# Patient Record
Sex: FEMALE
[De-identification: names, ages, dates, MRNs, and addresses within clinical notes are randomized per-mention and may not be internally consistent; named-entity substitution may affect disease eponyms.]

---
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
## 2024-02-22 ENCOUNTER — OFFICE VISIT (OUTPATIENT)
Dept: FAMILY MEDICINE CLINIC | Facility: CLINIC | Age: 19
End: 2024-02-22
Payer: MEDICAID

## 2024-02-22 VITALS
SYSTOLIC BLOOD PRESSURE: 112 MMHG | OXYGEN SATURATION: 99 % | HEART RATE: 84 BPM | WEIGHT: 127.5 LBS | RESPIRATION RATE: 16 BRPM | HEIGHT: 64 IN | BODY MASS INDEX: 21.77 KG/M2 | DIASTOLIC BLOOD PRESSURE: 68 MMHG | TEMPERATURE: 97 F

## 2024-02-22 DIAGNOSIS — K62.5 RECTAL BLEEDING: ICD-10-CM

## 2024-02-22 DIAGNOSIS — K59.09 CHRONIC CONSTIPATION: ICD-10-CM

## 2024-02-22 DIAGNOSIS — M25.50 PAIN IN JOINT, MULTIPLE SITES: ICD-10-CM

## 2024-02-22 DIAGNOSIS — G44.219 EPISODIC TENSION-TYPE HEADACHE, NOT INTRACTABLE: Primary | ICD-10-CM

## 2024-02-22 DIAGNOSIS — Z13.21 ENCOUNTER FOR VITAMIN DEFICIENCY SCREENING: ICD-10-CM

## 2024-02-22 DIAGNOSIS — E28.2 PCOS (POLYCYSTIC OVARIAN SYNDROME): ICD-10-CM

## 2024-02-22 DIAGNOSIS — Z00.00 LABORATORY EXAM ORDERED AS PART OF ROUTINE GENERAL MEDICAL EXAMINATION: ICD-10-CM

## 2024-02-22 PROCEDURE — 99204 OFFICE O/P NEW MOD 45 MIN: CPT | Performed by: FAMILY MEDICINE

## 2024-02-22 RX ORDER — DROSPIRENONE AND ETHINYL ESTRADIOL 0.02-3(28)
1 KIT ORAL DAILY
COMMUNITY
Start: 2024-01-01

## 2024-02-22 RX ORDER — SPIRONOLACTONE 50 MG/1
50 TABLET, FILM COATED ORAL DAILY
COMMUNITY
Start: 2023-11-19

## 2024-02-22 NOTE — PROGRESS NOTES
Subjective:   Patient ID: Charmaine Bowens is a 18 year old female.    HPI  18yr old female presents as a new patient with multiple concerns today.   C/o headaches band-like across her forehead ongoing for the past 2-3wks. Usually notices it at nighttime. Does note having sensitivity to light and sound along with nausea. No vomiting. +stress. Does not drink enough fluids or eat a regular, healthy diet. No personal or family hx of migraine headaches.   Notes having joint pains in both her feet and knees over the past 2-3wks. No change in activity. No personal or family hx of autoimmune/rheum disorders.   PCOS, following with gyne. Stopped OCPs. Taking spironolactone 50mg po daily per pt. Menses irregular, LMP 1/26.   Chronic constipation over the past 5mo and notes bright red blood in stools.     Pmhx: PCOS  Pshx: none  All: NKDA  Meds: spironolactone 50mg po daily   Fam Hx: mom: arthritis, dad: HTN, HL, MGM: DM2, PGM: DM2  Soc Hx: engaged, studying at [employer], working at [employer], lives with parents and siblings, no pets, no smokers  Ob/gyne: menarche: 12, periods have always been irregular, LMP: 1/26, lasts about 1wk, never sexually active  Immunizations: UTD    History/Other:   Review of Systems   Constitutional:  Negative for appetite change and fatigue.   Eyes:  Positive for photophobia. Negative for visual disturbance.   Gastrointestinal:  Positive for blood in stool and constipation. Negative for abdominal pain, diarrhea, nausea and vomiting.   Genitourinary:  Positive for menstrual problem.   Musculoskeletal:  Positive for arthralgias.   Neurological:  Positive for headaches. Negative for dizziness and light-headedness.     Current Outpatient Medications   Medication Sig Dispense Refill    spironolactone 50 MG Oral Tab Take 1 tablet (50 mg total) by mouth daily. (Patient not taking: Reported on 2/26/2024)      Drospirenone-Ethinyl Estradiol 3-0.02 MG Oral Tab Take 1 tablet by mouth daily. (Patient not  taking: Reported on 2/22/2024)       Allergies:Not on File    Objective:   Physical Exam  Vitals and nursing note reviewed.   Constitutional:       Appearance: Normal appearance.   HENT:      Head: Normocephalic and atraumatic.      Right Ear: Tympanic membrane normal.      Left Ear: Tympanic membrane normal.      Mouth/Throat:      Mouth: Mucous membranes are moist.   Eyes:      Extraocular Movements: Extraocular movements intact.      Pupils: Pupils are equal, round, and reactive to light.   Cardiovascular:      Rate and Rhythm: Normal rate and regular rhythm.   Pulmonary:      Effort: Pulmonary effort is normal.      Breath sounds: Normal breath sounds. No wheezing, rhonchi or rales.   Abdominal:      General: Bowel sounds are normal.      Palpations: Abdomen is soft.      Tenderness: There is no abdominal tenderness. There is no guarding or rebound.   Musculoskeletal:      Cervical back: Neck supple.   Skin:     General: Skin is warm and dry.   Neurological:      General: No focal deficit present.      Mental Status: She is alert and oriented to person, place, and time.      Cranial Nerves: No cranial nerve deficit.   Psychiatric:         Mood and Affect: Mood normal.         Behavior: Behavior normal.         Assessment & Plan:   1. Episodic tension-type headache, not intractable  - neuro exam wnl  - discussed healthy eating habits, increase water intake, proper sleep hygiene, regular exercise and headache diary  - manage stress    2. Pain in joint, multiple sites  - will check labs  - symptomatic tx    3. PCOS (polycystic ovarian syndrome)  - following with gyne    4. Chronic constipation  5. Rectal bleeding  - Pt is instructed to increase fiber in the diet by eating a bran cereal in the morning and more fruits and vegetables during the day  - instructed to use Metamucil and Miralax daily  - fluids also need to be increased  - if persists, should return and consider GI referral for colonoscopy.      6.  Laboratory exam ordered as part of routine general medical examination  - CBC With Differential With Platelet; Future  - Comp Metabolic Panel; Future  - Lipid Panel; Future  - TSH W Reflex To Free T4; Future    7. Encounter for vitamin deficiency screening  - Vitamin D, 25-Hydroxy; Future  - Vitamin B12 [E]; Future    She understands and agrees with tx plan  RTC after completing labs for annual physical, sooner if needed    Meds This Visit:  Requested Prescriptions      No prescriptions requested or ordered in this encounter       Imaging & Referrals:  None

## 2024-02-23 ENCOUNTER — LAB ENCOUNTER (OUTPATIENT)
Dept: LAB | Age: 19
End: 2024-02-23
Attending: FAMILY MEDICINE
Payer: MEDICAID

## 2024-02-23 DIAGNOSIS — Z13.21 ENCOUNTER FOR VITAMIN DEFICIENCY SCREENING: ICD-10-CM

## 2024-02-23 DIAGNOSIS — Z00.00 LABORATORY EXAM ORDERED AS PART OF ROUTINE GENERAL MEDICAL EXAMINATION: ICD-10-CM

## 2024-02-23 LAB
ALBUMIN SERPL-MCNC: 4 G/DL (ref 3.4–5)
ALBUMIN/GLOB SERPL: 1.2 {RATIO} (ref 1–2)
ALP LIVER SERPL-CCNC: 68 U/L
ALT SERPL-CCNC: 26 U/L
ANION GAP SERPL CALC-SCNC: 5 MMOL/L (ref 0–18)
AST SERPL-CCNC: 25 U/L (ref 15–37)
BASOPHILS # BLD AUTO: 0.02 X10(3) UL (ref 0–0.2)
BASOPHILS NFR BLD AUTO: 0.3 %
BILIRUB SERPL-MCNC: 0.8 MG/DL (ref 0.1–2)
BUN BLD-MCNC: 9 MG/DL (ref 9–23)
CALCIUM BLD-MCNC: 9 MG/DL (ref 8.5–10.1)
CHLORIDE SERPL-SCNC: 108 MMOL/L (ref 98–112)
CHOLEST SERPL-MCNC: 163 MG/DL (ref ?–200)
CO2 SERPL-SCNC: 25 MMOL/L (ref 21–32)
CREAT BLD-MCNC: 0.61 MG/DL
EGFRCR SERPLBLD CKD-EPI 2021: 132 ML/MIN/1.73M2 (ref 60–?)
EOSINOPHIL # BLD AUTO: 0.05 X10(3) UL (ref 0–0.7)
EOSINOPHIL NFR BLD AUTO: 0.8 %
ERYTHROCYTE [DISTWIDTH] IN BLOOD BY AUTOMATED COUNT: 12.8 %
FASTING PATIENT LIPID ANSWER: YES
FASTING STATUS PATIENT QL REPORTED: YES
GLOBULIN PLAS-MCNC: 3.4 G/DL (ref 2.8–4.4)
GLUCOSE BLD-MCNC: 93 MG/DL (ref 70–99)
HCT VFR BLD AUTO: 38.5 %
HDLC SERPL-MCNC: 55 MG/DL (ref 40–59)
HGB BLD-MCNC: 12.6 G/DL
IMM GRANULOCYTES # BLD AUTO: 0.01 X10(3) UL (ref 0–1)
IMM GRANULOCYTES NFR BLD: 0.2 %
LDLC SERPL CALC-MCNC: 99 MG/DL (ref ?–100)
LYMPHOCYTES # BLD AUTO: 2.5 X10(3) UL (ref 1.5–5)
LYMPHOCYTES NFR BLD AUTO: 41.7 %
MCH RBC QN AUTO: 27.7 PG (ref 26–34)
MCHC RBC AUTO-ENTMCNC: 32.7 G/DL (ref 31–37)
MCV RBC AUTO: 84.6 FL
MONOCYTES # BLD AUTO: 0.54 X10(3) UL (ref 0.1–1)
MONOCYTES NFR BLD AUTO: 9 %
NEUTROPHILS # BLD AUTO: 2.88 X10 (3) UL (ref 1.5–7.7)
NEUTROPHILS # BLD AUTO: 2.88 X10(3) UL (ref 1.5–7.7)
NEUTROPHILS NFR BLD AUTO: 48 %
NONHDLC SERPL-MCNC: 108 MG/DL (ref ?–130)
OSMOLALITY SERPL CALC.SUM OF ELEC: 284 MOSM/KG (ref 275–295)
PLATELET # BLD AUTO: 239 10(3)UL (ref 150–450)
POTASSIUM SERPL-SCNC: 3.6 MMOL/L (ref 3.5–5.1)
PROT SERPL-MCNC: 7.4 G/DL (ref 6.4–8.2)
RBC # BLD AUTO: 4.55 X10(6)UL
SODIUM SERPL-SCNC: 138 MMOL/L (ref 136–145)
TRIGL SERPL-MCNC: 42 MG/DL (ref 30–149)
TSI SER-ACNC: 1.49 MIU/ML (ref 0.36–3.74)
VIT B12 SERPL-MCNC: 426 PG/ML (ref 193–986)
VLDLC SERPL CALC-MCNC: 7 MG/DL (ref 0–30)
WBC # BLD AUTO: 6 X10(3) UL (ref 4–11)

## 2024-02-23 PROCEDURE — 80061 LIPID PANEL: CPT

## 2024-02-23 PROCEDURE — 82306 VITAMIN D 25 HYDROXY: CPT

## 2024-02-23 PROCEDURE — 85025 COMPLETE CBC W/AUTO DIFF WBC: CPT

## 2024-02-23 PROCEDURE — 80053 COMPREHEN METABOLIC PANEL: CPT

## 2024-02-23 PROCEDURE — 82607 VITAMIN B-12: CPT

## 2024-02-23 PROCEDURE — 84443 ASSAY THYROID STIM HORMONE: CPT

## 2024-02-23 PROCEDURE — 36415 COLL VENOUS BLD VENIPUNCTURE: CPT

## 2024-02-24 LAB — VIT D+METAB SERPL-MCNC: 14.3 NG/ML (ref 30–100)

## 2024-02-26 ENCOUNTER — OFFICE VISIT (OUTPATIENT)
Dept: FAMILY MEDICINE CLINIC | Facility: CLINIC | Age: 19
End: 2024-02-26
Payer: MEDICAID

## 2024-02-26 VITALS
DIASTOLIC BLOOD PRESSURE: 56 MMHG | OXYGEN SATURATION: 98 % | RESPIRATION RATE: 16 BRPM | HEART RATE: 92 BPM | HEIGHT: 64 IN | TEMPERATURE: 98 F | WEIGHT: 127.5 LBS | SYSTOLIC BLOOD PRESSURE: 102 MMHG | BODY MASS INDEX: 21.77 KG/M2

## 2024-02-26 DIAGNOSIS — Z00.00 ROUTINE GENERAL MEDICAL EXAMINATION AT A HEALTH CARE FACILITY: Primary | ICD-10-CM

## 2024-02-26 DIAGNOSIS — E28.2 PCOS (POLYCYSTIC OVARIAN SYNDROME): ICD-10-CM

## 2024-02-26 DIAGNOSIS — N94.6 DYSMENORRHEA: ICD-10-CM

## 2024-02-26 DIAGNOSIS — E55.9 VITAMIN D DEFICIENCY: ICD-10-CM

## 2024-02-26 PROCEDURE — 99395 PREV VISIT EST AGE 18-39: CPT | Performed by: FAMILY MEDICINE

## 2024-02-26 PROCEDURE — 99213 OFFICE O/P EST LOW 20 MIN: CPT | Performed by: FAMILY MEDICINE

## 2024-02-26 RX ORDER — IBUPROFEN 600 MG/1
600 TABLET ORAL 2 TIMES DAILY PRN
Qty: 60 TABLET | Refills: 0 | Status: SHIPPED | OUTPATIENT
Start: 2024-02-26

## 2024-02-26 NOTE — PROGRESS NOTES
Chief Complaint   Patient presents with    Physical       HPI:  18yr old female presents for her annual physical and f/u on recent labs.   PCOS, not taking any meds for it. Started her period and notes increased pain and cramping. Requesting rx for ibuprofen.     Review of Systems   Constitutional: Negative for fever, chills, +fatigue   HENT: Negative for hearing loss, congestion, sore throat    Eyes: Negative for pain and visual disturbance.   Respiratory: Negative for cough, chest tightness, shortness of breath and wheezing.    Cardiovascular: Negative for chest pain, palpitations and leg swelling.   Gastrointestinal: Negative for nausea, vomiting, abdominal pain, diarrhea   Genitourinary: Negative for dysuria, hematuria and difficulty urinating. Per hpi  Musculoskeletal: Negative for myalgias, back pain, joint swelling   Skin: Negative for color change and rash.   Neurological: Negative for dizziness, syncope, weakness, and headaches.   Hematological: Negative for adenopathy. Does not bruise/bleed easily.   Psychiatric/Behavioral: The patient is not nervous/anxious. No depression.    There is no problem list on file for this patient.      No past medical history on file.  No past surgical history on file.  No family history on file.  Social History     Socioeconomic History    Marital status: Single   Tobacco Use    Smoking status: Never     Passive exposure: Never    Smokeless tobacco: Never   Vaping Use    Vaping Use: Never used   Substance and Sexual Activity    Alcohol use: Never    Drug use: Never    Sexual activity: Never       Current Outpatient Medications   Medication Sig Dispense Refill    Cholecalciferol (VITAMIN D3) 1.25 MG (35772 UT) Oral Tab Take 50,000 tablets by mouth every 7 days. 12 tablet 0    ibuprofen 600 MG Oral Tab Take 1 tablet (600 mg total) by mouth 2 (two) times daily as needed for Pain. 60 tablet 0    spironolactone 50 MG Oral Tab Take 1 tablet (50 mg total) by mouth daily. (Patient  not taking: Reported on 2/26/2024)      Drospirenone-Ethinyl Estradiol 3-0.02 MG Oral Tab Take 1 tablet by mouth daily. (Patient not taking: Reported on 2/22/2024)         Not on File      Physical Exam  /56   Pulse 92   Temp 97.8 °F (36.6 °C) (Temporal)   Resp 16   Ht 5' 4\" (1.626 m)   Wt 127 lb 8 oz (57.8 kg)   LMP 02/25/2024   SpO2 98%   BMI 21.89 kg/m²   Constitutional: Oriented to person, place, and time. No distress.   HEENT:  Normocephalic and atraumatic. Hearing and tympanic membranes normal. Oropharynx is clear and moist.   Eyes: EOM are normal. PERRLA.    Neck: Supple  Cardiovascular: Normal rate, regular rhythm and intact distal pulses.  No murmur, rubs or gallops.   Pulmonary/Chest: Effort normal and breath sounds normal. No respiratory distress. No wheezes, rhonchi or rales  Abdominal: Soft. Bowel sounds are normal. Non tender, no masses, no organomegaly   Musculoskeletal: Normal range of motion. No edema and no tenderness.   Lymphadenopathy: No cervical adenopathy.   Neurological: Normal reflexes. No cranial nerve deficit or sensory deficit. Normal muscle tone. Coordination normal.   Skin: Skin is warm and dry. No rash noted. No erythema   Psychiatric: Normal mood and affect.     Health Maintenance:    1. Colon cancer screening: n/a  2. Last mammogram: n/a  3. Last Pap smear: n/a  4. Dexa Scan: n/a  5. Immunizations:   Immunization History   Administered Date(s) Administered    >=3 YRS TRI  MULTIDOSE VIAL (60442) FLU CLINIC 11/14/2014, 10/06/2017    Covid-19 Vaccine Pfizer 30 mcg/0.3 ml 08/10/2021, 09/04/2021    DTAP INFANRIX 05/01/2004, 06/14/2004, 07/30/2004, 08/30/2004    Flucelvax 0.5ml Vaccine 10/01/2020, 09/04/2022, 10/02/2023    HEP A,Ped/Adol,(2 Dose) 04/28/2006    Hep B, Unspecified Formulation 11/17/2014, 05/02/2016, 06/23/2016    IPV 11/14/2014    Influenza 11/13/2016    MMR 11/14/2014, 05/02/2016    Meningococcal B, Omv 07/14/2021    Meningococcal-Menveo 10-55 YEARS  05/02/2016, 07/14/2021    TDAP 11/14/2014, 08/24/2018    Varicella 03/30/2005, 11/14/2014       Osteoporosis Screening: Post menopausal women with a > 9% of fracture in the next 10 years.   Http://www.shef.ac.uk/FRAX/tool.aspx?country=9    Cervical Cancer Screening: The USPSTF recommends screening for cervical cancer in women ages 21 to 65 years with cytology (Pap smear) every 3 years or, for women ages 30 to 65 years who want to lengthen the screening interval, screening with a combination of cytology and human papillomavirus (HPV) testing every 5 years.    Breast Cancer Screening: Yearly starting at the age of 40.  If positive family hx (first degree relative) - Annual mammography starting ten years prior to the age of the youngest family member with breast cancer (but not earlier than age 25 and not later than age 40)    Colon Cancer Screening: Starting at the age of 50.  Yearly FOBT, sigmoidoscopy every 5 years, or colonoscopy every 10 years.       A/P:  1. Routine general medical examination at a health care facility  - reviewed anticipatory guidance based on age    2. Vitamin D deficiency  - vit d: 14.3  - start vit d3 50,000u qweekly x 3mo, then vit d3 2000iu daily otc  - Cholecalciferol (VITAMIN D3) 1.25 MG (06237 UT) Oral Tab; Take 50,000 tablets by mouth every 7 days.  Dispense: 12 tablet; Refill: 0    3. PCOS (polycystic ovarian syndrome)  4. Dysmenorrhea  - following with gyne  - given rx for ibuprofen to help with menstrual pain/bleeding, reviewed indications, dosing and SEs   - monitor  - ibuprofen 600 MG Oral Tab; Take 1 tablet (600 mg total) by mouth 2 (two) times daily as needed for Pain.  Dispense: 60 tablet; Refill: 0    She understands and agrees with tx plan  RTC in upcoming weeks to discuss other issues     Imaging & Consults:  None      No follow-ups on file.

## 2024-03-13 ENCOUNTER — TELEPHONE (OUTPATIENT)
Age: 19
End: 2024-03-13

## 2024-03-14 ENCOUNTER — OFFICE VISIT (OUTPATIENT)
Dept: FAMILY MEDICINE CLINIC | Facility: CLINIC | Age: 19
End: 2024-03-14
Payer: MEDICAID

## 2024-03-14 VITALS
WEIGHT: 128 LBS | RESPIRATION RATE: 16 BRPM | DIASTOLIC BLOOD PRESSURE: 80 MMHG | SYSTOLIC BLOOD PRESSURE: 109 MMHG | HEART RATE: 113 BPM | HEIGHT: 64 IN | TEMPERATURE: 98 F | BODY MASS INDEX: 21.85 KG/M2 | OXYGEN SATURATION: 97 %

## 2024-03-14 DIAGNOSIS — J11.1 INFLUENZA-LIKE ILLNESS: Primary | ICD-10-CM

## 2024-03-14 PROCEDURE — 87637 SARSCOV2&INF A&B&RSV AMP PRB: CPT | Performed by: NURSE PRACTITIONER

## 2024-03-14 PROCEDURE — 99213 OFFICE O/P EST LOW 20 MIN: CPT | Performed by: NURSE PRACTITIONER

## 2024-03-14 RX ORDER — OSELTAMIVIR PHOSPHATE 75 MG/1
75 CAPSULE ORAL 2 TIMES DAILY
Qty: 10 CAPSULE | Refills: 0 | Status: SHIPPED | OUTPATIENT
Start: 2024-03-14 | End: 2024-03-19

## 2024-03-14 NOTE — PROGRESS NOTES
Chief Complaint   Patient presents with    Cold     Suspected fever, chills, runny nose, sore throat, sneezing, x2 days    :    HPI:   Charmaine Bowens is a 18 year old female who presents for upper respiratory symptoms for 2 days. Started suddenly.  Symptoms have been consistent since onset.  Feeling feverish, chills, headache, congestion, dry cough, malaise, body aches, weakness, rhinorrhea, no sore throat. Tolerating po. Denies rash, N/V/D.   Did not receive flu vaccine this season.  unknown influenza exposure. unknown COVID exposure.  Treatment tried: OTC    Current Outpatient Medications   Medication Sig Dispense Refill    oseltamivir 75 MG Oral Cap Take 1 capsule (75 mg total) by mouth 2 (two) times daily for 5 days. 10 capsule 0    buPROPion  MG Oral Tablet 24 Hr Take 1 tablet (150 mg total) by mouth daily. 30 tablet 1    Cholecalciferol (VITAMIN D3) 1.25 MG (36354 UT) Oral Tab Take 50,000 tablets by mouth every 7 days. 12 tablet 0    ibuprofen 600 MG Oral Tab Take 1 tablet (600 mg total) by mouth 2 (two) times daily as needed for Pain. 60 tablet 0    spironolactone 50 MG Oral Tab Take 1 tablet (50 mg total) by mouth daily. (Patient not taking: Reported on 2/26/2024)      Drospirenone-Ethinyl Estradiol 3-0.02 MG Oral Tab Take 1 tablet by mouth daily. (Patient not taking: Reported on 2/22/2024)        No past medical history on file.   No past surgical history on file.   Family History   Problem Relation Age of Onset    Diabetes Maternal Grandmother     Diabetes Paternal Grandmother     Asthma Brother       Social History     Socioeconomic History    Marital status: Single   Tobacco Use    Smoking status: Never     Passive exposure: Never    Smokeless tobacco: Never   Vaping Use    Vaping Use: Never used   Substance and Sexual Activity    Alcohol use: Never    Drug use: Never    Sexual activity: Never         REVIEW OF SYSTEMS:   GENERAL: see HPI  SKIN: no rashes  EYES:denies blurred vision or double  vision  HEENT: congested; see HPI  CHEST: no chest pains, palpitations.  LUNGS: denies shortness of breath or wheezing.  CARDIOVASCULAR: denies chest pain.  GI: no abdominal pain; see HPI  URO: no decreased urination.      EXAM:   /80   Pulse 113   Temp 98.2 °F (36.8 °C) (Temporal)   Resp 16   Ht 5' 4\" (1.626 m)   Wt 128 lb (58.1 kg)   LMP 02/25/2024   SpO2 97%   BMI 21.97 kg/m²   GENERAL: well developed, well nourished, in no apparent distress, acutely sick.  SKIN: no rashes,no suspicious lesions, flushed.  Warm to touch.  HEAD: atraumatic, normocephalic,  no tenderness on palpation of sinuses  EYES: conjunctiva clear  EARS: TM's clear gray, no bulging, no retraction, no fluid, bony landmarks visible  NOSE: nostrils patent, clear nasal mucous, nasal mucosa reddened and swollen  THROAT: oral mucosa pink, moist. Posterior pharynx is not erythematous. no exudates.  NECK: supple, non-tender  LUNGS: clear to auscultation bilaterally, no wheezes or rhonchi. Breathing is non labored.  Dry cough.  CARDIO: RRR without murmur  GI: good BS's,no masses, HSM or tenderness  EXTREMITIES: no cyanosis, clubbing or edema  LYMPH:  + anterior cervical lymphadenopathy.          ASSESSMENT AND PLAN:   Charmaine Bowens is a 18 year old female who presents with flu-like symptoms    ASSESSMENT:  Encounter Diagnosis   Name Primary?    Influenza-like illness Yes       PLAN:  Natural course of influenza, possible complications, CDC recommendations, and treatment options discussed.  Patient has elected to start Tamiflu after discussion of risks and benefits. Explained may lessen severity and duration of symptoms, but will not completely remove symptoms.  Quad panel sent.  Will stop Tamiflu if negative influenza.  Rest, increase fluids,ibuprofen/tylenol q 6 hours for fever/aches prn.   Discussed OTC options for symptom relief.    Complications of influenza discussed including secondary infections such as AOM, bronchitis, PNA, sinusitis.   To be rechecked if exhibiting any symptoms of these illnesses.   To f/u with PCP in 3-4 days if sx's persist. Seek immediate medical attention for acute or worsening symptoms.   Verbalizes understanding of these issues and agrees to the plan.    Meds & Refills for this Visit:  Requested Prescriptions     Signed Prescriptions Disp Refills    oseltamivir 75 MG Oral Cap 10 capsule 0     Sig: Take 1 capsule (75 mg total) by mouth 2 (two) times daily for 5 days.         Patient Instructions   I recommend the 4 H's for inflammation:    1. Heat (warm mist from the shower or warm liquids such as tea)  2. Honey (mixed in your tea or by the spoonful [if you are not diabetic; over the age of 1 year]--take a spoonful 3 times a day and don't eat or drink anything for 15-20 minutes)  3. Humidity--cool mist in the bedroom at night  4. Hydration --at least 8 -10 glasses a day

## 2024-03-15 LAB
FLUAV + FLUBV RNA SPEC NAA+PROBE: NOT DETECTED
FLUAV + FLUBV RNA SPEC NAA+PROBE: NOT DETECTED
RSV RNA SPEC NAA+PROBE: NOT DETECTED
SARS-COV-2 RNA RESP QL NAA+PROBE: NOT DETECTED

## 2024-03-20 ENCOUNTER — TELEPHONE (OUTPATIENT)
Age: 19
End: 2024-03-20

## 2024-03-20 NOTE — TELEPHONE ENCOUNTER
[facility]  [address]  [place] [address]  Phone: [phone]    [facility]   [address] [place] [address]  [place] [address]  Phone: [phone]    [facility]  [address] [place] [address]  Phone: [phone]    [facility]  [address]  [place] [address]  Phone: [phone]

## 2024-05-02 ENCOUNTER — LAB ENCOUNTER (OUTPATIENT)
Dept: LAB | Age: 19
End: 2024-05-02
Attending: FAMILY MEDICINE
Payer: MEDICAID

## 2024-05-02 DIAGNOSIS — M25.50 POLYARTHRALGIA: ICD-10-CM

## 2024-05-02 LAB
CRP SERPL-MCNC: 0.42 MG/DL (ref ?–0.3)
ERYTHROCYTE [SEDIMENTATION RATE] IN BLOOD: 12 MM/HR
RHEUMATOID FACT SERPL-ACNC: <10 IU/ML (ref ?–15)
URATE SERPL-MCNC: 3.7 MG/DL

## 2024-05-02 PROCEDURE — 86225 DNA ANTIBODY NATIVE: CPT

## 2024-05-02 PROCEDURE — 86200 CCP ANTIBODY: CPT

## 2024-05-02 PROCEDURE — 85652 RBC SED RATE AUTOMATED: CPT

## 2024-05-02 PROCEDURE — 87086 URINE CULTURE/COLONY COUNT: CPT | Performed by: FAMILY MEDICINE

## 2024-05-02 PROCEDURE — 86038 ANTINUCLEAR ANTIBODIES: CPT

## 2024-05-02 PROCEDURE — 36415 COLL VENOUS BLD VENIPUNCTURE: CPT

## 2024-05-02 PROCEDURE — 86431 RHEUMATOID FACTOR QUANT: CPT

## 2024-05-02 PROCEDURE — 84550 ASSAY OF BLOOD/URIC ACID: CPT

## 2024-05-02 PROCEDURE — 86140 C-REACTIVE PROTEIN: CPT

## 2024-05-03 LAB
CCP IGG SERPL-ACNC: 2.2 U/ML (ref 0–6.9)
DSDNA IGG SERPL IA-ACNC: 2.2 IU/ML
ENA AB SER QL IA: 0.4 UG/L
ENA AB SER QL IA: NEGATIVE

## 2024-05-09 ENCOUNTER — PATIENT MESSAGE (OUTPATIENT)
Dept: FAMILY MEDICINE CLINIC | Facility: CLINIC | Age: 19
End: 2024-05-09

## 2024-05-09 NOTE — TELEPHONE ENCOUNTER
From: Charmaine Bowens  To: Earle Bourne  Sent: 5/9/2024 1:58 PM CDT  Subject: Test result    Hi Dr. Bourne,  I am happy the test results came back all normal aH but I wanted to know if presence of leukocytes (white blood cells) is normal?  Thank you,  Charmaine Bowens

## 2024-05-09 NOTE — TELEPHONE ENCOUNTER
Good morning Charmaine,  Your urine testing was negative for infection. Therefore, no need for antibiotics. Please let me know if you have any questions/concerns.  Dr. ZULLY Mahoney   Written by Earle Bourne DO on 5/7/2024  9:44 AM CDT  Seen by patient Charmaine Kwan on 5/7/2024  7:29 PM      Pt asking about leukocytes is urine. Trace amount noted in results

## 2024-05-13 ENCOUNTER — TELEPHONE (OUTPATIENT)
Dept: INTERNAL MEDICINE CLINIC | Facility: CLINIC | Age: 19
End: 2024-05-13

## 2024-05-13 NOTE — TELEPHONE ENCOUNTER
Received call from pt. Pt asked if we received any information regarding her previous immunization records from previous pediatrician office. Notified I do not see any documentation regarding receive of fax. Pt stated she reached out to have them fax previous records and has not heard back. Stated her previous MD was Dr. Ashwin Jones ([phone]), pt provided me with contact info. Notified pt I can call and have them fax over the records if she is ok if I call. Pt is appreciative and stated ok for me to call. Notified I will keep her updated. Pt asking if she can get the immunizations if she hasn't had them before. Stated yes, if she is missing any, she can receive at our office.     Called Dr. Ashwin Jones's office. Spoke with  who stated that they don't keep patient records when they are no longer their patient for 3 years. Asked if there was a medical record department. Stated no they get rid of charts. The  stated that I could go on the [facility] website and locate any pt's vaccine records.     Went on [facility] website and there is a form that can be filled out in order to obtain immunization records. Needs patient signature.     Left VM for pt to call back.     Link to form:   [email or web address]

## 2024-05-13 NOTE — TELEPHONE ENCOUNTER
No further work-up needed, given UCx negative. It was likely an over read by the machine, if there were any cells in the urine dip

## 2024-05-13 NOTE — TELEPHONE ENCOUNTER
Received call back from the pt. Notified of info below. Pt stated she will fill out the form. Stated ok for me to send link through . Advised pt to keep us updated if she is able to obtain more records. Pt verbalizes understanding and is agreeable to plan. Appreciative of call and info.

## 2024-05-20 ENCOUNTER — TELEPHONE (OUTPATIENT)
Dept: INTERNAL MEDICINE CLINIC | Facility: CLINIC | Age: 19
End: 2024-05-20

## 2024-05-25 DIAGNOSIS — E55.9 VITAMIN D DEFICIENCY: ICD-10-CM

## 2024-05-28 ENCOUNTER — NURSE ONLY (OUTPATIENT)
Dept: FAMILY MEDICINE CLINIC | Facility: CLINIC | Age: 19
End: 2024-05-28

## 2024-05-28 ENCOUNTER — LAB ENCOUNTER (OUTPATIENT)
Dept: LAB | Age: 19
End: 2024-05-28
Attending: FAMILY MEDICINE

## 2024-05-28 ENCOUNTER — TELEPHONE (OUTPATIENT)
Dept: FAMILY MEDICINE CLINIC | Facility: CLINIC | Age: 19
End: 2024-05-28

## 2024-05-28 DIAGNOSIS — Z11.1 SCREENING-PULMONARY TB: ICD-10-CM

## 2024-05-28 DIAGNOSIS — Z01.84 IMMUNITY STATUS TESTING: ICD-10-CM

## 2024-05-28 LAB
HBV SURFACE AB SER QL: REACTIVE
HBV SURFACE AB SERPL IA-ACNC: >1000 MIU/ML

## 2024-05-28 PROCEDURE — 36415 COLL VENOUS BLD VENIPUNCTURE: CPT

## 2024-05-28 PROCEDURE — 86706 HEP B SURFACE ANTIBODY: CPT

## 2024-05-28 PROCEDURE — 90471 IMMUNIZATION ADMIN: CPT | Performed by: FAMILY MEDICINE

## 2024-05-28 PROCEDURE — 90632 HEPA VACCINE ADULT IM: CPT | Performed by: FAMILY MEDICINE

## 2024-05-28 PROCEDURE — 90651 9VHPV VACCINE 2/3 DOSE IM: CPT | Performed by: FAMILY MEDICINE

## 2024-05-28 PROCEDURE — 86480 TB TEST CELL IMMUN MEASURE: CPT

## 2024-05-28 PROCEDURE — 90472 IMMUNIZATION ADMIN EACH ADD: CPT | Performed by: FAMILY MEDICINE

## 2024-05-28 NOTE — TELEPHONE ENCOUNTER
Patient is here for Nurse visit for vaccines.  Is requesting an order for a Quantiferon TB blood test.  Has never had a TST in the past. Parents are unsure if patient had BCG vaccine.  Born in [place].  Reviewed notes from Dr. Bourne who is not in the office. Reviewed with Dr. Leiws and Dr. Trivedi.  Order entered for Quantiferon TB Plus blood test for TB screening for school.

## 2024-05-29 RX ORDER — FOLIC ACID 1 MG/1
1 TABLET ORAL
Qty: 12 CAPSULE | Refills: 0 | OUTPATIENT
Start: 2024-05-29

## 2024-05-29 NOTE — TELEPHONE ENCOUNTER
Please review.  Protocol failed / Has no protocol.     Requested Prescriptions   Pending Prescriptions Disp Refills    Cholecalciferol (VITAMIN D3) 1.25 MG (79027 UT) Oral Tab 12 tablet 0     Sig: Take 50,000 tablets by mouth every 7 days.       There is no refill protocol information for this order     Future Appointments         Provider Department Appt Notes    In 4 months Kd Eduardo MD [facility], [facility]          Recent Outpatient Visits              Yesterday     [facility], [address] [place] [address]    Nurse Only    3 weeks ago Anxiety with depression    [address], Earle Diaz, DO    Office Visit    2 months ago Influenza-like illness    [facility], [facility], [place] [address], [place] Greta Jackman, CATER[place]    Office Visit    2 months ago Anxiety with depression    [address] [place], Earle Diaz, DO    Office Visit    3 months ago Routine general medical examination at a health care facility    [facility], [address] [place] [address] [place], Earle Diaz, DO    Office Visit

## 2024-05-31 LAB
M TB IFN-G CD4+ T-CELLS BLD-ACNC: 0.03 IU/ML
M TB TUBERC IFN-G BLD QL: NEGATIVE
M TB TUBERC IGNF/MITOGEN IGNF CONTROL: >10 IU/ML
QFT TB1 AG MINUS NIL: 0.01 IU/ML
QFT TB2 AG MINUS NIL: 0.02 IU/ML

## 2024-06-03 ENCOUNTER — PATIENT MESSAGE (OUTPATIENT)
Dept: FAMILY MEDICINE CLINIC | Facility: CLINIC | Age: 19
End: 2024-06-03

## 2024-07-02 ENCOUNTER — MED REC SCAN ONLY (OUTPATIENT)
Dept: FAMILY MEDICINE CLINIC | Facility: CLINIC | Age: 19
End: 2024-07-02

## 2024-08-11 ENCOUNTER — HOSPITAL ENCOUNTER (OUTPATIENT)
Age: 19
Discharge: HOME | End: 2024-08-11
Attending: EMERGENCY MEDICINE
Payer: MEDICAID

## 2024-08-11 VITALS
RESPIRATION RATE: 18 BRPM | OXYGEN SATURATION: 100 % | DIASTOLIC BLOOD PRESSURE: 69 MMHG | TEMPERATURE: 97 F | HEART RATE: 74 BPM | SYSTOLIC BLOOD PRESSURE: 105 MMHG

## 2024-08-11 DIAGNOSIS — H00.015 HORDEOLUM EXTERNUM OF LEFT LOWER EYELID: Primary | ICD-10-CM

## 2024-08-11 PROCEDURE — 99213 OFFICE O/P EST LOW 20 MIN: CPT

## 2024-08-11 PROCEDURE — 99203 OFFICE O/P NEW LOW 30 MIN: CPT

## 2024-08-11 RX ORDER — MOXIFLOXACIN 5 MG/ML
1 SOLUTION/ DROPS OPHTHALMIC 3 TIMES DAILY
Qty: 3 ML | Refills: 0 | Status: SHIPPED | OUTPATIENT
Start: 2024-08-11 | End: 2024-08-18

## 2024-08-11 NOTE — DISCHARGE INSTRUCTIONS
Thank you for visiting our immediate care for your health care needs.  Please follow up with your ophthalmologist or ophthalmologist as referred in the next few days.  Use Vigamox as prescribed.  No contacts until your stye has resolved.  If you have any additional problems please return to the immediate care.

## 2024-08-11 NOTE — ED PROVIDER NOTES
Patient Seen in: [facility]      History     Chief Complaint   Patient presents with    Eye Visual Problem     Stated Complaint: Fever - Eye infection like symptoms    Subjective:     19-year-old female presents today for evaluation of left lower eyelid swelling which began yesterday.  No visual changes.  Patient does wear contacts.  History of having styes in the past.  Feels similar.  No trauma.  Minimal drainage yesterday but none today.    Objective:   No pertinent past medical history.            No pertinent past surgical history.              No pertinent social history.              Physical Exam     ED Triage Vitals [08/11/24 1252]   /69   Pulse 74   Resp 18   Temp 97 °F (36.1 °C)   Temp src Temporal   SpO2 100 %   O2 Device None (Room air)       Current:/69   Pulse 74   Temp 97 °F (36.1 °C) (Temporal)   Resp 18   LMP 04/08/2024   SpO2 100%         Physical Exam  Vitals reviewed.   Constitutional:       General: She is not in acute distress.     Appearance: Normal appearance. She is not ill-appearing or toxic-appearing.   HENT:      Head: Normocephalic and atraumatic.      Mouth/Throat:      Pharynx: No pharyngeal swelling.   Eyes:      General: Lids are normal. Lids are everted, no foreign bodies appreciated. Vision grossly intact.         Right eye: No foreign body, discharge or hordeolum.         Left eye: Hordeolum present.No foreign body or discharge.      Conjunctiva/sclera: Conjunctivae normal.      Pupils: Pupils are equal, round, and reactive to light.      Left eye: Pupil is round, reactive and not sluggish. No corneal abrasion or fluorescein uptake. Gianan exam negative.     Funduscopic exam:        Left eye: No hemorrhage or exudate.     Musculoskeletal:      Cervical back: Neck supple.   Skin:     General: Skin is warm and dry.   Neurological:      Mental Status: She is alert and oriented to person, place, and time.   Psychiatric:         Mood and Affect: Mood  normal.         ED Course   Labs Reviewed - No data to display  Imaging:  No results found.              MDM        19 year old female with left eye stye.  Will place on antibiotics.  Patient will not use her contacts until healed up.  Will follow-up with ophthalmology.    Differential diagnosis (including but not limited to):  Conjunctivitis, foreign body, corneal abrasion, stye    ED course:  Pulse Ox: 100% on room air which is normal      Comment: Please note this report has been produced using speech recognition software and may contain errors related to that system including errors in grammar, punctuation, and spelling, as well as words and phrases that may be inappropriate. If there are any questions or concerns please feel free to contact the dictating provider for clarification.                                     Medical Decision Making      Disposition and Plan     Clinical Impression:  1. Hordeolum externum of left lower eyelid         Disposition:  Discharge  8/11/2024  1:12 pm    Follow-up:  Darius Wood MD  [address] [facility]  [address]  [facility] [address]  [phone]    Schedule an appointment as soon as possible for a visit   This is an eye specialist.          Medications Prescribed:  Current Discharge Medication List        START taking these medications    Details   moxifloxacin (VIGAMOX) 0.5 % Ophthalmic Solution Place 1 drop into both eyes 3 (three) times daily for 7 days.  Qty: 3 mL, Refills: 0                                    Walker Roper MD  8/11/2024  1:07 PM

## 2024-08-11 NOTE — ED INITIAL ASSESSMENT (HPI)
Pt complaining of left eye discharge and redness starting yesterday, no vision changes but states she can see the swelling. Pt wears contacts, not currently wearing them.

## 2024-09-18 ENCOUNTER — TELEPHONE (OUTPATIENT)
Dept: FAMILY MEDICINE CLINIC | Facility: CLINIC | Age: 19
End: 2024-09-18

## 2024-10-10 ENCOUNTER — OFFICE VISIT (OUTPATIENT)
Dept: FAMILY MEDICINE CLINIC | Facility: CLINIC | Age: 19
End: 2024-10-10
Payer: MEDICAID

## 2024-10-10 VITALS
DIASTOLIC BLOOD PRESSURE: 64 MMHG | RESPIRATION RATE: 16 BRPM | BODY MASS INDEX: 22.28 KG/M2 | OXYGEN SATURATION: 98 % | TEMPERATURE: 98 F | SYSTOLIC BLOOD PRESSURE: 106 MMHG | WEIGHT: 130.5 LBS | HEART RATE: 85 BPM | HEIGHT: 64 IN

## 2024-10-10 DIAGNOSIS — Z02.1 PHYSICAL EXAM, PRE-EMPLOYMENT: Primary | ICD-10-CM

## 2024-10-10 DIAGNOSIS — Z23 NEED FOR VACCINATION: ICD-10-CM

## 2024-10-10 PROCEDURE — 90656 IIV3 VACC NO PRSV 0.5 ML IM: CPT | Performed by: FAMILY MEDICINE

## 2024-10-10 PROCEDURE — 90472 IMMUNIZATION ADMIN EACH ADD: CPT | Performed by: FAMILY MEDICINE

## 2024-10-10 PROCEDURE — 90651 9VHPV VACCINE 2/3 DOSE IM: CPT | Performed by: FAMILY MEDICINE

## 2024-10-10 PROCEDURE — 99395 PREV VISIT EST AGE 18-39: CPT | Performed by: FAMILY MEDICINE

## 2024-10-10 PROCEDURE — 90471 IMMUNIZATION ADMIN: CPT | Performed by: FAMILY MEDICINE

## 2024-10-10 RX ORDER — CHOLECALCIFEROL (VITAMIN D3) 50 MCG
TABLET ORAL
COMMUNITY

## 2024-10-10 RX ORDER — MULTIVITAMIN
TABLET ORAL
COMMUNITY

## 2024-10-10 NOTE — PROGRESS NOTES
Subjective:   Patient ID: Charmaine Bowens is a 19 year old female.    HPI  19yr old female presents for her pre-employment physical. 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 No work restrictions needed. 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      History/Other:   Review of Systems   Constitutional:  Negative for fatigue.   Cardiovascular:  Negative for chest pain and palpitations.   Gastrointestinal:  Negative for abdominal pain.   Genitourinary:  Negative for menstrual problem.   Musculoskeletal:  Negative for back pain.   Neurological:  Negative for light-headedness and headaches.     Current Outpatient Medications   Medication Sig Dispense Refill    Cholecalciferol (VITAMIN D) 50 MCG (2000 UT) Oral Tab Take by mouth.      Multiple Vitamin (MULTI-DAY VITAMINS) Oral Tab Take by mouth.      ibuprofen 600 MG Oral Tab Take 1 tablet (600 mg total) by mouth 2 (two) times daily as needed for Pain. 60 tablet 0    spironolactone 50 MG Oral Tab Take 1 tablet (50 mg total) by mouth daily.      buPROPion  MG Oral Tablet 24 Hr Take 1 tablet (150 mg total) by mouth daily. (Patient not taking: Reported on 10/10/2024) 30 tablet 1    Cholecalciferol (VITAMIN D3) 1.25 MG (25312 UT) Oral Tab Take 50,000 tablets by mouth every 7 days. (Patient not taking: Reported on 10/10/2024) 12 tablet 0    Drospirenone-Ethinyl Estradiol 3-0.02 MG Oral Tab Take 1 tablet by mouth daily. (Patient not taking: Reported on 10/10/2024)       Allergies:Allergies[1]    Objective:   Physical Exam  Vitals and nursing note reviewed.   Constitutional:       Appearance: Normal appearance.   HENT:      Head: Normocephalic and atraumatic.      Right Ear: Tympanic membrane normal.      Left Ear: Tympanic membrane normal.      Mouth/Throat:      Mouth: Mucous membranes are moist.   Eyes:      Extraocular Movements: Extraocular movements intact.      Pupils: Pupils are equal, round, and reactive to light.   Cardiovascular:      Rate and Rhythm:  Normal rate and regular rhythm.   Pulmonary:      Effort: Pulmonary effort is normal.      Breath sounds: Normal breath sounds. No wheezing, rhonchi or rales.   Abdominal:      General: Bowel sounds are normal.      Palpations: Abdomen is soft.      Tenderness: There is no abdominal tenderness. There is no guarding or rebound.   Musculoskeletal:         General: Normal range of motion.      Cervical back: Neck supple.   Skin:     General: Skin is warm and dry.   Neurological:      General: No focal deficit present.      Mental Status: She is alert and oriented to person, place, and time.   Psychiatric:         Mood and Affect: Mood normal.         Behavior: Behavior normal.         Assessment & Plan:   1. Physical exam, pre-employment    2. Need for vaccination    - cleared for work, no work restrictions needed  - TB gold neg 5/2024  - form completed  - updated influenza vaccine and HPV#2 today  - she understands and agrees with tx plan  - RTC as needed    Orders Placed This Encounter   Procedures    Human Papillomavirus 9-valent vaccine, Recombinant (Gardasil 9) HPV 9 [58870]    INFLUENZA VACCINE, TRI, PRESERV FREE, 0.5 ML       Meds This Visit:  Requested Prescriptions      No prescriptions requested or ordered in this encounter       Imaging & Referrals:  HPV HUMAN PAPILLOMA VIRUS VACC 9 DILLON 3 DOSE IM  INFLUENZA VACCINE, TRI, PRESERV FREE, 0.5 ML         [1] No Known Allergies

## 2024-12-03 ENCOUNTER — OFFICE VISIT (OUTPATIENT)
Dept: FAMILY MEDICINE CLINIC | Facility: CLINIC | Age: 19
End: 2024-12-03
Payer: MEDICAID

## 2024-12-03 VITALS
SYSTOLIC BLOOD PRESSURE: 110 MMHG | BODY MASS INDEX: 21.85 KG/M2 | DIASTOLIC BLOOD PRESSURE: 78 MMHG | HEIGHT: 64 IN | TEMPERATURE: 98 F | HEART RATE: 107 BPM | OXYGEN SATURATION: 98 % | RESPIRATION RATE: 16 BRPM | WEIGHT: 128 LBS

## 2024-12-03 DIAGNOSIS — H66.002 NON-RECURRENT ACUTE SUPPURATIVE OTITIS MEDIA OF LEFT EAR WITHOUT SPONTANEOUS RUPTURE OF TYMPANIC MEMBRANE: Primary | ICD-10-CM

## 2024-12-03 DIAGNOSIS — J04.0 LARYNGITIS: ICD-10-CM

## 2024-12-03 DIAGNOSIS — J06.9 VIRAL URI WITH COUGH: ICD-10-CM

## 2024-12-03 PROCEDURE — 99213 OFFICE O/P EST LOW 20 MIN: CPT | Performed by: NURSE PRACTITIONER

## 2024-12-03 NOTE — PROGRESS NOTES
CHIEF COMPLAINT:     Chief Complaint   Patient presents with    Cold     Sinus pressure, cough, ST, L ear pain now blocked, laryngitis, felt feverish but did not check temp, severe SOB  Sx onset Thur   No recent Covid test  OTC Mucinex, Tylenol        HPI:   Charmaine Bowens is a 19 year old female who presents to clinic today with complaints of left ear pain. Has had for 5  days. Pain is described as throbbing and muffled.  Patient denies history of ear infections. Home treatment includes Mucinex and Tylenol.     Associated symptoms:  Patient denies decreased hearing. Patient denies hearing loss. Patient denies drainage. Patient denies use of cotton tipped ear swabs to clean the ears. Patient reports following URI symptoms: congestion, clear colored nasal discharge, dry cough, loss of voice.    Current Outpatient Medications   Medication Sig Dispense Refill    amoxicillin clavulanate 875-125 MG Oral Tab Give one tablet by mouth q 12 hours with food for 10 days 20 tablet 0    Cholecalciferol (VITAMIN D) 50 MCG (2000 UT) Oral Tab Take by mouth.      Multiple Vitamin (MULTI-DAY VITAMINS) Oral Tab Take by mouth.      buPROPion  MG Oral Tablet 24 Hr Take 1 tablet (150 mg total) by mouth daily. (Patient not taking: Reported on 10/10/2024) 30 tablet 1    Cholecalciferol (VITAMIN D3) 1.25 MG (74565 UT) Oral Tab Take 50,000 tablets by mouth every 7 days. (Patient not taking: Reported on 10/10/2024) 12 tablet 0    ibuprofen 600 MG Oral Tab Take 1 tablet (600 mg total) by mouth 2 (two) times daily as needed for Pain. 60 tablet 0    spironolactone 50 MG Oral Tab Take 1 tablet (50 mg total) by mouth daily.      Drospirenone-Ethinyl Estradiol 3-0.02 MG Oral Tab Take 1 tablet by mouth daily. (Patient not taking: Reported on 10/10/2024)        No past medical history on file.   Social History:  Social History     Socioeconomic History    Marital status: Single   Tobacco Use    Smoking status: Never     Passive exposure: Never     Smokeless tobacco: Never   Vaping Use    Vaping status: Never Used   Substance and Sexual Activity    Alcohol use: Never    Drug use: Never    Sexual activity: Never        REVIEW OF SYSTEMS:   GENERAL: See HPI  SKIN: no unusual skin lesions or rashes  HEENT: See HPI  LUNGS: No shortness of breath, or wheezing.  CARDIOVASCULAR: No chest pain, palpitations  GI: No N/V/C/D.  NEURO: denies headaches or dizziness    EXAM:   /78   Pulse 107   Temp 98.4 °F (36.9 °C) (Temporal)   Resp 16   Ht 5' 4\" (1.626 m)   Wt 128 lb (58.1 kg)   LMP 11/11/2024   SpO2 98%   BMI 21.97 kg/m²   GENERAL: well developed, well nourished,in no apparent distress  SKIN: no rashes,no suspicious lesions  HEAD: atraumatic, normocephalic  EYES: conjunctiva clear, EOM intact  EARS: Bilateral tragus non tender with manipulation.  External auditory canals WNL. Right TM: non-erythematous, no bulging, no retraction, no effusion, bony landmarks clearly visualized.  Left TM: erythematous, + bulging, no retraction, bony landmarks unable to visualize.  NOSE: nostrils patent, clear nasal mucus, nasal mucosa pink and non-inflamed  THROAT: oral mucosa pink, moist. Posterior pharynx is not erythematous or injected. No exudates.  NECK: supple, non-tender  LUNGS: clear to auscultation bilaterally, no wheezes or rhonchi. Breathing is non labored.  CARDIO: RRR without murmur  EXTREMITIES: no cyanosis, clubbing or edema  LYMPH: No lymphadenopathy.      ASSESSMENT AND PLAN:   Charmaine Bowens is a 19 year old female who presents with ear problems symptoms are consistent with    ASSESSMENT:  Encounter Diagnoses   Name Primary?    Non-recurrent acute suppurative otitis media of left ear without spontaneous rupture of tympanic membrane Yes    Viral URI with cough     Laryngitis        PLAN: Meds as listed below.  Comfort measures as described in Patient Instructions    Meds & Refills for this Visit:  Requested Prescriptions     Signed Prescriptions Disp Refills     amoxicillin clavulanate 875-125 MG Oral Tab 20 tablet 0     Sig: Give one tablet by mouth q 12 hours with food for 10 days         Risk and benefits of medication discussed.   If antibiotics prescribed, stressed importance of completing full course of antibiotic.     Acetaminophen or NSAID prn pain.      Follow up with PCP if s/sx worsen, do not begin to improve in 3 days, or if fever of 100.4 or greater persists for 72 hours.      Patient voiced understand and is in agreement with treatment plan.

## 2024-12-12 ENCOUNTER — TELEPHONE (OUTPATIENT)
Dept: FAMILY MEDICINE CLINIC | Facility: CLINIC | Age: 19
End: 2024-12-12

## 2024-12-13 ENCOUNTER — OFFICE VISIT (OUTPATIENT)
Dept: FAMILY MEDICINE CLINIC | Facility: CLINIC | Age: 19
End: 2024-12-13
Payer: MEDICAID

## 2024-12-13 VITALS
BODY MASS INDEX: 22 KG/M2 | TEMPERATURE: 98 F | HEART RATE: 84 BPM | SYSTOLIC BLOOD PRESSURE: 100 MMHG | DIASTOLIC BLOOD PRESSURE: 64 MMHG | OXYGEN SATURATION: 100 % | WEIGHT: 128 LBS | RESPIRATION RATE: 16 BRPM

## 2024-12-13 DIAGNOSIS — H69.92 DYSFUNCTION OF LEFT EUSTACHIAN TUBE: Primary | ICD-10-CM

## 2024-12-13 PROCEDURE — 99213 OFFICE O/P EST LOW 20 MIN: CPT | Performed by: NURSE PRACTITIONER

## 2024-12-13 RX ORDER — METHYLPREDNISOLONE 4 MG/1
TABLET ORAL
Qty: 1 EACH | Refills: 0 | Status: SHIPPED | OUTPATIENT
Start: 2024-12-13

## 2024-12-13 NOTE — PROGRESS NOTES
CHIEF COMPLAINT:     Chief Complaint   Patient presents with    Ear Problem     Follow up from last visit. Symptoms worsened and/or not improving. - Entered by patient  Left ear clogged for 2 weeks,  no drops used.  Last day of ABX.         HPI:   Charmaine Bowens is a 19 year old female who presents to clinic today with complaints of left ear problem. Has had for 2  weeks. Pt c/o decreased hearing, feels full, no pain, clogged.  Patient reports recent history of ear infection, finished last day of amox/clav today. Home treatment includes none.     Associated symptoms:  Patient reports decreased hearing. Patient denies hearing loss. Patient denies drainage. Patient denies use of cotton tipped ear swabs to clean the ears. Patient reports following URI symptoms: cough and mucous/nasal congestion    Current Outpatient Medications   Medication Sig Dispense Refill    methylPREDNISolone (MEDROL) 4 MG Oral Tablet Therapy Pack As directed. 1 each 0    Cholecalciferol (VITAMIN D) 50 MCG (2000 UT) Oral Tab Take by mouth.      Multiple Vitamin (MULTI-DAY VITAMINS) Oral Tab Take by mouth.      ibuprofen 600 MG Oral Tab Take 1 tablet (600 mg total) by mouth 2 (two) times daily as needed for Pain. 60 tablet 0    amoxicillin clavulanate 875-125 MG Oral Tab Give one tablet by mouth q 12 hours with food for 10 days 20 tablet 0    buPROPion  MG Oral Tablet 24 Hr Take 1 tablet (150 mg total) by mouth daily. (Patient not taking: Reported on 5/2/2024) 30 tablet 1    Cholecalciferol (VITAMIN D3) 1.25 MG (20846 UT) Oral Tab Take 50,000 tablets by mouth every 7 days. (Patient not taking: Reported on 12/13/2024) 12 tablet 0    spironolactone 50 MG Oral Tab Take 1 tablet (50 mg total) by mouth daily. (Patient not taking: Reported on 12/13/2024)      Drospirenone-Ethinyl Estradiol 3-0.02 MG Oral Tab Take 1 tablet by mouth daily. (Patient not taking: Reported on 2/22/2024)        History reviewed. No pertinent past medical history.   Social  History:  Social History     Socioeconomic History    Marital status: Single   Tobacco Use    Smoking status: Never     Passive exposure: Never    Smokeless tobacco: Never   Vaping Use    Vaping status: Never Used   Substance and Sexual Activity    Alcohol use: Never    Drug use: Never    Sexual activity: Never        REVIEW OF SYSTEMS:   GENERAL: See HPI  SKIN: no unusual skin lesions or rashes  HEENT: See HPI  LUNGS: No shortness of breath, or wheezing.  CARDIOVASCULAR: No chest pain, palpitations  GI: No N/V/C/D.  NEURO: denies headaches or dizziness    EXAM:   /64   Pulse 84   Temp 98.4 °F (36.9 °C) (Oral)   Resp 16   Wt 128 lb (58.1 kg)   LMP 11/11/2024   SpO2 100%   BMI 21.97 kg/m²   GENERAL: well developed, well nourished,in no apparent distress  SKIN: no rashes  HEAD: atraumatic, normocephalic  EYES: conjunctiva clear, EOM intact  EARS: bilat tragus non tender with manipulation.  External auditory canals clear. Right TM: grey, no bulging, no retraction, no effusion, bony landmarks visible.  Left TM: grey, no bulging, no retraction, + effusion, bony landmarks obscured.  NOSE: nostrils patent, no nasal mucus, nasal mucosa pink, moist  THROAT: oral mucosa pink, moist. Posterior pharynx not erythematous or injected. No exudates.  NECK: supple, non-tender  LUNGS: clear to auscultation bilaterally, no wheezes or rhonchi. Breathing is non labored.  CARDIO: RRR without murmur  EXTREMITIES: no cyanosis, clubbing or edema  LYMPH: no cervical lymphadenopathy.      ASSESSMENT AND PLAN:   Charmaine Bowens is a 19 year old female who presents with ear problems symptoms are consistent with    ASSESSMENT:  Encounter Diagnosis   Name Primary?    Dysfunction of left eustachian tube Yes       PLAN:   Meds as listed below.    Otc flonase as directed x 4 weeks  Comfort measures as described in Patient Instructions    Meds & Refills for this Visit:  Requested Prescriptions     Signed Prescriptions Disp Refills     methylPREDNISolone (MEDROL) 4 MG Oral Tablet Therapy Pack 1 each 0     Sig: As directed.         Risk and benefits of medication discussed.     Acetaminophen or NSAID prn pain.      Follow up with PCP if s/sx worsen, do not begin to improve in 3 days, or if fever of 100.4 or greater persists for 72 hours.    Patient voiced understand and is in agreement with treatment plan.    There are no Patient Instructions on file for this visit.

## 2024-12-13 NOTE — PROGRESS NOTES
CHIEF COMPLAINT:     Chief Complaint   Patient presents with    Ear Problem     Follow up from last visit. Symptoms worsened and/or not improving. - Entered by patient  Left ear clogged for 2 weeks,  no drops used.  Last day of ABX.         HPI:   Charmaine Bowens is a 19 year old female who presents for upper respiratory symptoms for  {NUMBER:82624} {DMG-DAY_WEEK_MONTH:161}. Patient reports {MPN_URI_SX'S:51597}. Symptoms have been *** since onset.  Treating symptoms with ***.      Current Outpatient Medications   Medication Sig Dispense Refill    methylPREDNISolone (MEDROL) 4 MG Oral Tablet Therapy Pack As directed. 1 each 0    Cholecalciferol (VITAMIN D) 50 MCG (2000 UT) Oral Tab Take by mouth.      Multiple Vitamin (MULTI-DAY VITAMINS) Oral Tab Take by mouth.      ibuprofen 600 MG Oral Tab Take 1 tablet (600 mg total) by mouth 2 (two) times daily as needed for Pain. 60 tablet 0    amoxicillin clavulanate 875-125 MG Oral Tab Give one tablet by mouth q 12 hours with food for 10 days 20 tablet 0    buPROPion  MG Oral Tablet 24 Hr Take 1 tablet (150 mg total) by mouth daily. (Patient not taking: Reported on 5/2/2024) 30 tablet 1    Cholecalciferol (VITAMIN D3) 1.25 MG (83957 UT) Oral Tab Take 50,000 tablets by mouth every 7 days. (Patient not taking: Reported on 12/13/2024) 12 tablet 0    spironolactone 50 MG Oral Tab Take 1 tablet (50 mg total) by mouth daily. (Patient not taking: Reported on 12/13/2024)      Drospirenone-Ethinyl Estradiol 3-0.02 MG Oral Tab Take 1 tablet by mouth daily. (Patient not taking: Reported on 2/22/2024)        History reviewed. No pertinent past medical history.   History reviewed. No pertinent surgical history.      Social History     Socioeconomic History    Marital status: Single   Tobacco Use    Smoking status: Never     Passive exposure: Never    Smokeless tobacco: Never   Vaping Use    Vaping status: Never Used   Substance and Sexual Activity    Alcohol use: Never    Drug use: Never     Sexual activity: Never         REVIEW OF SYSTEMS:   GENERAL: *** appetite  SKIN: no rashes or abnormal skin lesions  HEENT: See HPI  LUNGS: See HPI  CARDIOVASCULAR: denies chest pain or palpitations   GI: denies N/V/C or abdominal pain      EXAM:   /64   Pulse 84   Temp 98.4 °F (36.9 °C) (Oral)   Resp 16   Wt 128 lb (58.1 kg)   LMP 11/11/2024   SpO2 100%   BMI 21.97 kg/m²   GENERAL: well developed, well nourished,in no apparent distress  SKIN: no rashes,no suspicious lesions  HEAD: atraumatic, normocephalic.  *** tenderness on palpation of *** sinuses  EYES: conjunctiva clear, EOM intact  EARS: TM's ***, *** bulging, ***retraction,*** fluid, bony landmarks ***  NOSE: Nostrils patent, *** nasal discharge, nasal mucosa ***   THROAT: Oral mucosa pink, moist. Posterior pharynx is *** erythematous. *** exudates. Tonsils ***/4.    NECK: Supple, non-tender  LUNGS: clear to auscultation bilaterally, no wheezes or rhonchi. Breathing is non labored.  CARDIO: RRR without murmur  EXTREMITIES: no cyanosis, clubbing or edema  LYMPH:  *** lymphadenopathy.        ASSESSMENT AND PLAN:   Charmaine Bowens is a 19 year old female who presents with upper respiratory symptoms that are consistent with    ASSESSMENT:   Encounter Diagnosis   Name Primary?    Dysfunction of left eustachian tube Yes       PLAN: Meds as below.  Comfort care as described in Patient Instructions    Meds & Refills for this Visit:  Requested Prescriptions     Signed Prescriptions Disp Refills    methylPREDNISolone (MEDROL) 4 MG Oral Tablet Therapy Pack 1 each 0     Sig: As directed.     Risks, benefits, and side effects of medication explained and discussed.    The patient indicates understanding of these issues and agrees to the plan.  The patient is asked to f/u with PCP if sx's persist or worsen.  There are no Patient Instructions on file for this visit.         none

## 2024-12-13 NOTE — TELEPHONE ENCOUNTER
Spoke with patient.  Was seen 12/3/24 and placed on Augmentin for an ear infection.  There is no pain but notes ear congestion and decreased hearing.  She is also feeling feverish again and coughing more.    Recommend Flonase and Claritin for ear fullness.    She is recommended to follow up for re-evaluation.  May need CXR-if comes here needs to come during the day when x-ray is open and we have enough time to get results before closing.      Go to ED with dyspnea, chest pain.    Patient verbalizes understanding of plan.

## 2025-02-10 ENCOUNTER — OFFICE VISIT (OUTPATIENT)
Dept: FAMILY MEDICINE CLINIC | Facility: CLINIC | Age: 20
End: 2025-02-10
Payer: MEDICAID

## 2025-02-10 VITALS
OXYGEN SATURATION: 99 % | HEIGHT: 64 IN | TEMPERATURE: 97 F | HEART RATE: 90 BPM | WEIGHT: 127 LBS | RESPIRATION RATE: 16 BRPM | BODY MASS INDEX: 21.68 KG/M2 | DIASTOLIC BLOOD PRESSURE: 60 MMHG | SYSTOLIC BLOOD PRESSURE: 104 MMHG

## 2025-02-10 DIAGNOSIS — Z11.3 SCREENING FOR VENEREAL DISEASE: ICD-10-CM

## 2025-02-10 DIAGNOSIS — R39.15 URINARY URGENCY: ICD-10-CM

## 2025-02-10 DIAGNOSIS — J45.20 MILD INTERMITTENT ASTHMA WITHOUT COMPLICATION (HCC): ICD-10-CM

## 2025-02-10 DIAGNOSIS — K59.09 CHRONIC CONSTIPATION: Primary | ICD-10-CM

## 2025-02-10 DIAGNOSIS — K62.5 BRBPR (BRIGHT RED BLOOD PER RECTUM): ICD-10-CM

## 2025-02-10 LAB
BILIRUB UR QL STRIP.AUTO: NEGATIVE
COLOR UR AUTO: YELLOW
GLUCOSE (URINE DIPSTICK): NEGATIVE MG/DL
GLUCOSE UR STRIP.AUTO-MCNC: NORMAL MG/DL
KETONES (URINE DIPSTICK): NEGATIVE MG/DL
KETONES UR STRIP.AUTO-MCNC: NEGATIVE MG/DL
LEUKOCYTE ESTERASE UR QL STRIP.AUTO: 500
MULTISTIX LOT#: ABNORMAL NUMERIC
NITRITE UR QL STRIP.AUTO: NEGATIVE
NITRITE, URINE: NEGATIVE
PH UR STRIP.AUTO: 6 [PH] (ref 5–8)
PH, URINE: 6 (ref 4.5–8)
PROT UR STRIP.AUTO-MCNC: 20 MG/DL
PROTEIN (URINE DIPSTICK): 30 MG/DL
SP GR UR STRIP.AUTO: 1.03 (ref 1–1.03)
SPECIFIC GRAVITY: 1.03 (ref 1–1.03)
UROBILINOGEN UR STRIP.AUTO-MCNC: NORMAL MG/DL
UROBILINOGEN,SEMI-QN: 0.2 MG/DL (ref 0–1.9)

## 2025-02-10 PROCEDURE — 87591 N.GONORRHOEAE DNA AMP PROB: CPT | Performed by: FAMILY MEDICINE

## 2025-02-10 PROCEDURE — 81001 URINALYSIS AUTO W/SCOPE: CPT | Performed by: FAMILY MEDICINE

## 2025-02-10 PROCEDURE — 99214 OFFICE O/P EST MOD 30 MIN: CPT | Performed by: FAMILY MEDICINE

## 2025-02-10 PROCEDURE — 81003 URINALYSIS AUTO W/O SCOPE: CPT | Performed by: FAMILY MEDICINE

## 2025-02-10 PROCEDURE — 87086 URINE CULTURE/COLONY COUNT: CPT | Performed by: FAMILY MEDICINE

## 2025-02-10 PROCEDURE — 87491 CHLMYD TRACH DNA AMP PROBE: CPT | Performed by: FAMILY MEDICINE

## 2025-02-10 RX ORDER — POLYETHYLENE GLYCOL 3350 17 G/17G
17 POWDER, FOR SOLUTION ORAL DAILY
COMMUNITY

## 2025-02-10 RX ORDER — ALBUTEROL SULFATE 90 UG/1
2 INHALANT RESPIRATORY (INHALATION) EVERY 6 HOURS PRN
Qty: 1 EACH | Refills: 0 | Status: SHIPPED | OUTPATIENT
Start: 2025-02-10

## 2025-02-10 NOTE — PROGRESS NOTES
Subjective:   Patient ID: Charmaine Bowens is a 19 year old female.    HPI  19yr old female presents for c/o constipation, blood in stool, urinary frequency and f/u on asthma.  Chronic constipation ongoing for years. Has been taking metamucil and miralax more regularly but does not she has to strain for BMs. Had a couple episodes over the past week that she saw bright red blood in the toilet and in her stool. Denies any abdominal pain or rectal pain.  Has noted increased urinary urgency over the past few days. Denies any dysuria, hematuria, urinary frequency, flank pain, etc.  Asthma, stable. Requesting refill on her albuterol.          History/Other:   Review of Systems   Respiratory:  Negative for cough and shortness of breath.    Gastrointestinal:  Positive for blood in stool and constipation. Negative for abdominal pain, diarrhea, nausea, rectal pain and vomiting.   Genitourinary:  Positive for urgency. Negative for dysuria, flank pain, frequency and menstrual problem.     Current Outpatient Medications   Medication Sig Dispense Refill    Misc Natural Products (FIBER 7 OR) Take by mouth.      polyethylene glycol, PEG 3350, 17 g Oral Powd Pack Take 17 g by mouth daily.      albuterol 108 (90 Base) MCG/ACT Inhalation Aero Soln Inhale 2 puffs into the lungs every 6 (six) hours as needed. 1 each 0    Cholecalciferol (VITAMIN D) 50 MCG (2000 UT) Oral Tab Take by mouth.      Multiple Vitamin (MULTI-DAY VITAMINS) Oral Tab Take by mouth.      ibuprofen 600 MG Oral Tab Take 1 tablet (600 mg total) by mouth 2 (two) times daily as needed for Pain. 60 tablet 0    methylPREDNISolone (MEDROL) 4 MG Oral Tablet Therapy Pack As directed. (Patient not taking: Reported on 2/10/2025) 1 each 0    buPROPion  MG Oral Tablet 24 Hr Take 1 tablet (150 mg total) by mouth daily. (Patient not taking: Reported on 2/10/2025) 30 tablet 1    Cholecalciferol (VITAMIN D3) 1.25 MG (86970 UT) Oral Tab Take 50,000 tablets by mouth every 7 days.  (Patient not taking: Reported on 10/10/2024) 12 tablet 0    spironolactone 50 MG Oral Tab Take 1 tablet (50 mg total) by mouth daily. (Patient not taking: Reported on 2/10/2025)      Drospirenone-Ethinyl Estradiol 3-0.02 MG Oral Tab Take 1 tablet by mouth daily. (Patient not taking: Reported on 2/10/2025)       Allergies:Allergies[1]    Objective:   Physical Exam  Vitals and nursing note reviewed.   Constitutional:       Appearance: Normal appearance.   HENT:      Head: Normocephalic and atraumatic.   Cardiovascular:      Rate and Rhythm: Normal rate and regular rhythm.   Pulmonary:      Effort: Pulmonary effort is normal.      Breath sounds: Normal breath sounds. No wheezing, rhonchi or rales.   Abdominal:      General: Bowel sounds are normal.      Palpations: Abdomen is soft.      Tenderness: There is no abdominal tenderness. There is no guarding or rebound.   Skin:     General: Skin is warm and dry.   Neurological:      Mental Status: She is alert and oriented to person, place, and time.   Psychiatric:         Mood and Affect: Mood normal.         Behavior: Behavior normal.         Assessment & Plan:   1. Chronic constipation  2. BRBPR (bright red blood per rectum)  - pt is instructed to increase fiber in the diet by eating a bran cereal in the morning and more fruits and vegetables during the day  - cont Miralax once daily  - fluids also need to be increased  - monitor symptoms  - will refer to GI, Dr. Ackerman for further eval and recs  - Gastro Referral - In Network    3. Urinary urgency  - urine dip: large blood, small bili, trace leuk and +protein (currently on menses)  - will check UCx and tx based on Cx results  - Urine Dip, auto without Micro  - UA/M With Culture Reflex [E]; Future  - UA/M With Culture Reflex [E]  - Urine Culture, Routine    4. Mild intermittent asthma without complication (HCC)  - stable  - cpm  - refills provided  - albuterol 108 (90 Base) MCG/ACT Inhalation Aero Soln; Inhale 2 puffs into  the lungs every 6 (six) hours as needed.  Dispense: 1 each; Refill: 0    5. Screening for venereal disease  - Chlamydia/Gc Amplification [E]; Future  - Chlamydia/Gc Amplification [E]    She understands and agrees with tx plan  RTC as needed    Meds This Visit:  Requested Prescriptions     Signed Prescriptions Disp Refills    albuterol 108 (90 Base) MCG/ACT Inhalation Aero Soln 1 each 0     Sig: Inhale 2 puffs into the lungs every 6 (six) hours as needed.       Imaging & Referrals:  [facility]STR[facility] - INTERNAL         [1] No Known Allergies

## 2025-02-11 LAB
C TRACH DNA SPEC QL NAA+PROBE: NEGATIVE
N GONORRHOEA DNA SPEC QL NAA+PROBE: NEGATIVE

## 2025-04-07 ENCOUNTER — OFFICE VISIT (OUTPATIENT)
Age: 20
End: 2025-04-07

## 2025-04-07 VITALS
WEIGHT: 130 LBS | SYSTOLIC BLOOD PRESSURE: 109 MMHG | HEART RATE: 112 BPM | DIASTOLIC BLOOD PRESSURE: 75 MMHG | BODY MASS INDEX: 22.2 KG/M2 | HEIGHT: 64 IN

## 2025-04-07 DIAGNOSIS — M54.2 NECK PAIN: ICD-10-CM

## 2025-04-07 DIAGNOSIS — M79.18 MYOFASCIAL PAIN: Primary | ICD-10-CM

## 2025-04-07 PROCEDURE — 99204 OFFICE O/P NEW MOD 45 MIN: CPT | Performed by: INTERNAL MEDICINE

## 2025-04-07 RX ORDER — CYCLOBENZAPRINE HCL 5 MG
5 TABLET ORAL NIGHTLY
Qty: 7 TABLET | Refills: 0 | Status: SHIPPED | OUTPATIENT
Start: 2025-04-07 | End: 2025-04-14

## 2025-04-07 NOTE — PATIENT INSTRUCTIONS
You were seen today for diffuse joint and muscle pain  Blood work for lupus, inflammation and rheumatoid arthritis were negative  I do think your symptoms are more consistent with fibromyalgia which is chronic muscle pain  Lets get some more blood work  For your neck pain try Flexeril which is a muscle relaxer at night once a day.  Do not take it during the day as it can make you sleepy.    Could consider Cymbalta which may help some of your muscle pain.

## 2025-04-07 NOTE — PROGRESS NOTES
Charmaine Bowens is a 20 year old female who presents for   Chief Complaint   Patient presents with    Consult    Pain   .   HPI:   CC: joint pain   Consult: referred by PCP Dr. Bourne    This is a 21 yo F with hx of GERD, Migraine's referred for joint pain. She has joint pain mostly in the legs and arms. She started to notice pain within the last year. She also has pain in the muscles chiquita in the legs, thigh region. Pain is worse at night. Also has pain with activity.   Has pain in both feet, chiquita with activity.  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  She had pain in the hands, in the PIPs. Hands can be stiff at times. Not hard to make fist. No swelling in hands.  No pain in elbows  Has some pain in shoulders, knees and lower back  No swelling in the joints  No hx of psoriasis  Her mom has osteoarthritis  No family hx of autoimmune disease  She takes ibuprofen as needed  Going to school for health science     Blood work:  Neg CTD screen, dsDNA  Neg ESR, CRP, RF, CCP, Uric acid 3.7        Wt Readings from Last 2 Encounters:   04/07/25 130 lb (59 kg)   02/10/25 127 lb (57.6 kg)     Body mass index is 22.31 kg/m².      Current Outpatient Medications   Medication Sig Dispense Refill    Misc Natural Products (FIBER 7 OR) Take by mouth.      albuterol 108 (90 Base) MCG/ACT Inhalation Aero Soln Inhale 2 puffs into the lungs every 6 (six) hours as needed. 1 each 0    Cholecalciferol (VITAMIN D) 50 MCG (2000 UT) Oral Tab Take by mouth.      Multiple Vitamin (MULTI-DAY VITAMINS) Oral Tab Take by mouth.      ibuprofen 600 MG Oral Tab Take 1 tablet (600 mg total) by mouth 2 (two) times daily as needed for Pain. 60 tablet 0    polyethylene glycol, PEG 3350, 17 g Oral Powd Pack Take 17 g by mouth daily. (Patient not taking: Reported on 4/7/2025)      methylPREDNISolone (MEDROL) 4 MG Oral Tablet Therapy Pack As directed. (Patient not taking: Reported on 4/7/2025) 1 each 0     buPROPion  MG Oral Tablet 24 Hr Take 1 tablet (150 mg total) by mouth daily. (Patient not taking: Reported on 5/2/2024) 30 tablet 1    Cholecalciferol (VITAMIN D3) 1.25 MG (34134 UT) Oral Tab Take 50,000 tablets by mouth every 7 days. (Patient not taking: Reported on 4/7/2025) 12 tablet 0    spironolactone 50 MG Oral Tab Take 1 tablet (50 mg total) by mouth daily. (Patient not taking: Reported on 12/13/2024)      Drospirenone-Ethinyl Estradiol 3-0.02 MG Oral Tab Take 1 tablet by mouth daily. (Patient not taking: Reported on 2/22/2024)        History reviewed. No pertinent past medical history.   History reviewed. No pertinent surgical history.   Family History   Problem Relation Age of Onset    Arthritis Mother     Asthma Brother     Diabetes Maternal Grandmother     Diabetes Paternal Grandmother       Social History:  Social History     Socioeconomic History    Marital status: Single   Tobacco Use    Smoking status: Never     Passive exposure: Never    Smokeless tobacco: Never   Vaping Use    Vaping status: Never Used   Substance and Sexual Activity    Alcohol use: Never    Drug use: Never    Sexual activity: Never     Social Drivers of Health     Food Insecurity: No Food Insecurity (2/10/2025)    NCSS - Food Insecurity     Worried About Running Out of Food in the Last Year: No     Ran Out of Food in the Last Year: No   Transportation Needs: No Transportation Needs (2/10/2025)    NCSS - Transportation     Lack of Transportation: No   Housing Stability: Not At Risk (2/10/2025)    NCSS - Housing/Utilities     Has Housing: Yes     Worried About Losing Housing: No     Unable to Get Utilities: No           REVIEW OF SYSTEMS:   Review Of Systems:  Constitutional: No fever, no change in weight or appetitie  Derm: No rashes, no oral ulcers, no alopecia, no photosensitivity, no psoriasis  HEENT: No dry eyes, no dry mouth, no Raynaud's, no nasal ulcers, no parotid swelling, no neck pain, no jaw pain, no temple  pain  Eyes: No visual changes,   CVS: No chest pain, no heart disease  RS: No SOB, no Cough, No Pleurtic pain,   GI: No nausea, no vomiiting, no abominal pain, no hx of ulcer, no gastritis, no heartburn, no dyshpagia, no BRBPR or melena  : no dysuria, no hx of miscarriages, no DVT Hx, no hx of OCP,   Neuro: No numbness or tingling, no headache, no hx of seizures,   Psych: no hx of anxiety or depression  ENDO: no hx of thyroid disease, no hx of DM  Joint/Muscluskeltal: see HPI,   All other ROS are negative.     EXAM:   /75 (BP Location: Left arm, Patient Position: Sitting, Cuff Size: adult)   Pulse 112   Ht 5' 4\" (1.626 m)   Wt 130 lb (59 kg)   LMP 01/14/2025   BMI 22.31 kg/m²   GEN: AAOx3, NAD  HEENT: EOMI, PERRLA, no injection or icterus, oral mucosa moist, no oral lesions. No lymphadenopathy. No facial rash  CVS: RRR, no murmurs rubs or gallops. Equal 2+ distal pulses.   LUNGS: CTAB, no increased work of breathing  ABDOMEN:  soft NT/ND, +BS, no HSM  SKIN: No rashes or skin lesions. No nail findings  MSK:  TTP in deltoid region and bilateral thighs  No swelling or synovitis on exam  Full range of motion in all joints  NEURO: Cranial nerves II-XII intact grossly. 5/5 strength throughout in both upper and lower extremities, sensation intact.  PSYCH: normal mood    LABS:     Component      Latest Ref Rng 5/2/2024   Expanded NAWAF Antibody Screen, IGG      <0.7 ug/l 0.40    Anti-dsDNA antibody      <10 IU/mL 2.2    Connective Tissue Disease Screen Interpretation      Negative  Negative    URIC ACID      2.6 - 6.0 mg/dL 3.7    RHEUMATOID FACTOR      <15 IU/mL <10    C-Citrullinated Peptide IgG AB      0.0 - 6.9 U/mL 2.2    SED RATE      0 - 20 mm/Hr 12    C-REACTIVE PROTEIN      <0.30 mg/dL 0.42 (H)         IMAGING:     None    ASSESSMENT AND PLAN:     Diffuse myofascial pain  - Reports significant pain in the muscles of her legs, thighs.  Also has significant pain in her feet  - No evidence of swelling or  synovitis on exam.  No history of psoriasis  - Blood work showed negative DARWIN, ESR, CRP, RF and CCP  - Will order further blood work for symptom seen more consistent with myofascial pain  - Could consider Cymbalta or gabapentin    Neck pain  - She is having acute neck pain more musculoskeletal.  She will try Flexeril at night for a week.  Advised not to take during the day as it can make her sleepy.  This may also help some of her muscle pain    Thank you for allowing me to participate in this patients care. Pt will f/u in 3 mos     Marleny Lewis MD  4/7/2025  2:31 PM

## 2025-04-16 ENCOUNTER — LAB ENCOUNTER (OUTPATIENT)
Dept: LAB | Age: 20
End: 2025-04-16
Attending: INTERNAL MEDICINE
Payer: MEDICAID

## 2025-04-16 DIAGNOSIS — M79.18 MYOFASCIAL PAIN: ICD-10-CM

## 2025-04-16 PROCEDURE — 81374 HLA I TYPING 1 ANTIGEN LR: CPT

## 2025-04-20 ENCOUNTER — PATIENT MESSAGE (OUTPATIENT)
Age: 20
End: 2025-04-20

## 2025-04-22 LAB
HLA B27 SCREENING: NEGATIVE
HLA B27 SCREENING: NEGATIVE

## 2025-04-24 ENCOUNTER — PATIENT MESSAGE (OUTPATIENT)
Age: 20
End: 2025-04-24

## 2025-06-16 ENCOUNTER — OFFICE VISIT (OUTPATIENT)
Dept: FAMILY MEDICINE CLINIC | Facility: CLINIC | Age: 20
End: 2025-06-16
Payer: MEDICAID

## 2025-06-16 ENCOUNTER — NURSE TRIAGE (OUTPATIENT)
Dept: FAMILY MEDICINE CLINIC | Facility: CLINIC | Age: 20
End: 2025-06-16

## 2025-06-16 ENCOUNTER — APPOINTMENT (OUTPATIENT)
Dept: ULTRASOUND IMAGING | Age: 20
End: 2025-06-16
Attending: PHYSICIAN ASSISTANT
Payer: MEDICAID

## 2025-06-16 ENCOUNTER — HOSPITAL ENCOUNTER (EMERGENCY)
Age: 20
Discharge: HOME | End: 2025-06-16
Payer: MEDICAID

## 2025-06-16 VITALS
DIASTOLIC BLOOD PRESSURE: 75 MMHG | OXYGEN SATURATION: 98 % | SYSTOLIC BLOOD PRESSURE: 114 MMHG | HEART RATE: 92 BPM | RESPIRATION RATE: 18 BRPM | TEMPERATURE: 98 F

## 2025-06-16 VITALS
OXYGEN SATURATION: 98 % | SYSTOLIC BLOOD PRESSURE: 114 MMHG | TEMPERATURE: 99 F | BODY MASS INDEX: 21.85 KG/M2 | DIASTOLIC BLOOD PRESSURE: 73 MMHG | HEART RATE: 86 BPM | RESPIRATION RATE: 18 BRPM | WEIGHT: 128 LBS | HEIGHT: 64 IN

## 2025-06-16 DIAGNOSIS — R39.15 URINARY URGENCY: Primary | ICD-10-CM

## 2025-06-16 DIAGNOSIS — N89.8 VAGINAL DISCHARGE: ICD-10-CM

## 2025-06-16 DIAGNOSIS — N94.6 DYSMENORRHEA: ICD-10-CM

## 2025-06-16 DIAGNOSIS — R10.2 PELVIC PAIN: Primary | ICD-10-CM

## 2025-06-16 DIAGNOSIS — R10.2 PELVIC PAIN: ICD-10-CM

## 2025-06-16 LAB
APPEARANCE: CLEAR
BILIRUBIN: NEGATIVE
CONTROL LINE PRESENT WITH A CLEAR BACKGROUND (YES/NO): YES YES/NO
GLUCOSE (URINE DIPSTICK): NEGATIVE MG/DL
KETONES (URINE DIPSTICK): NEGATIVE MG/DL
KIT LOT #: NORMAL NUMERIC
LEUKOCYTES: NEGATIVE
MULTISTIX LOT#: NORMAL NUMERIC
NITRITE, URINE: NEGATIVE
OCCULT BLOOD: NEGATIVE
PH, URINE: 7 (ref 4.5–8)
PREGNANCY TEST, URINE: NEGATIVE
PROTEIN (URINE DIPSTICK): NEGATIVE MG/DL
SPECIFIC GRAVITY: 1.02 (ref 1–1.03)
URINE-COLOR: YELLOW
UROBILINOGEN,SEMI-QN: 0.2 MG/DL (ref 0–1.9)

## 2025-06-16 PROCEDURE — 87491 CHLMYD TRACH DNA AMP PROBE: CPT | Performed by: PHYSICIAN ASSISTANT

## 2025-06-16 PROCEDURE — 99284 EMERGENCY DEPT VISIT MOD MDM: CPT

## 2025-06-16 PROCEDURE — 76856 US EXAM PELVIC COMPLETE: CPT | Performed by: PHYSICIAN ASSISTANT

## 2025-06-16 PROCEDURE — 81514 NFCT DS BV&VAGINITIS DNA ALG: CPT | Performed by: PHYSICIAN ASSISTANT

## 2025-06-16 PROCEDURE — 93975 VASCULAR STUDY: CPT | Performed by: PHYSICIAN ASSISTANT

## 2025-06-16 PROCEDURE — 87591 N.GONORRHOEAE DNA AMP PROB: CPT | Performed by: PHYSICIAN ASSISTANT

## 2025-06-16 RX ORDER — IBUPROFEN 600 MG/1
600 TABLET, FILM COATED ORAL EVERY 8 HOURS PRN
Qty: 30 TABLET | Refills: 0 | Status: SHIPPED | OUTPATIENT
Start: 2025-06-16 | End: 2025-06-23

## 2025-06-16 NOTE — ED QUICK NOTES
An unsuccessful attempt to perform a pelvic exam was made by Mari GRUBBS. The pt states she is not sexually active and was not able to tolerate the procedure.

## 2025-06-16 NOTE — ED QUICK NOTES
To ER for ev\al of intermittent lower abdominal/suprapubic pain for the past 1-2 months,which got worse after she went zip lining 2 days ago. The pt denies any trauma to the abdomen. States she feels a pressure to the suprapubic region radiating downwards at times. She's had intermittent white vag dc x1 moth. No c/o urinary symptoms. She was seen @ [facility] and had a neg urine dip. No c/o n/v/diarrhea or urinary symptoms.

## 2025-06-16 NOTE — TELEPHONE ENCOUNTER
Action Requested: Summary for Provider     []  Critical Lab, Recommendations Needed  [] Need Additional Advice  []   FYI    []   Need Orders  [] Need Medications Sent to Pharmacy  []  Other     SUMMARY: Called patient. She reports urinary symptoms x 1 month. She reports urgency, cloudy urine, chills, headache, backache, side pain (closer to front, not flank), more vaginal discharge. Denies any possibility of STI. Advised patient to go to [facility] today to rule out UTI. Patient is agreeable to plan. She will keep appointment schedule for UC follow up appointment if needed.     Reason for call: Urinary Symptoms  Onset: 1 month     Reason for Disposition   Painful urination AND EITHER frequency or urgency    Protocols used: Urination Pain - Female-A-[place]

## 2025-06-16 NOTE — ED PROVIDER NOTES
Patient Seen in: [facility] [place]        History  Chief Complaint   Patient presents with    Abdomen/Flank Pain    Nausea/Vomiting/Diarrhea     Stated Complaint: low abd pain for past 2 days.  states pain is intermittent for past 2 months.    Subjective:   HPI            20-year-old female with past medical history of PCOS who presents to the ER for suprapubic pain that started about a month ago and then worsened 2 days ago.  She reports associated vaginal discharge, creamy white, for the past 2 months.  She was seen in urgent care earlier today where she had urine testing completed and was referred to the ER for further testing.  She denies any other nausea, vomiting, diarrhea, constipation, dysuria, frequency or any other complaints.  Her last menstrual period was last week.  Denies any other prior abdominal surgical history.  Patient has never been sexually active in the past.,  Denies any exposure to STIs.  No other complaints.      Objective:     Past Medical History:    PCOS (polycystic ovarian syndrome)              History reviewed. No pertinent surgical history.             Social History     Socioeconomic History    Marital status:    Tobacco Use    Smoking status: Never     Passive exposure: Never    Smokeless tobacco: Never   Vaping Use    Vaping status: Never Used   Substance and Sexual Activity    Alcohol use: Never    Drug use: Never    Sexual activity: Never     Social Drivers of Health     Food Insecurity: No Food Insecurity (2/10/2025)    NCSS - Food Insecurity     Worried About Running Out of Food in the Last Year: No     Ran Out of Food in the Last Year: No   Transportation Needs: No Transportation Needs (2/10/2025)    NCSS - Transportation     Lack of Transportation: No   Housing Stability: Not At Risk (2/10/2025)    NCSS - Housing/Utilities     Has Housing: Yes     Worried About Losing Housing: No     Unable to Get Utilities: No                                 Physical Exam    ED Triage Vitals [06/16/25 1630]   /73   Pulse 86   Resp 18   Temp 98.6 °F (37 °C)   Temp src Temporal   SpO2 98 %   O2 Device None (Room air)       Current Vitals:   Vital Signs  BP: 114/73  Pulse: 86  Resp: 18  Temp: 98.6 °F (37 °C)  Temp src: Temporal    Oxygen Therapy  SpO2: 98 %  O2 Device: None (Room air)            Physical Exam  GENERAL APPEARANCE:  AxOx4, generally well-appearing, no acute distress.  HEENT:  NC, AT. MMM. EOMI, clear conjunctiva, oropharynx clear.  NECK:  Supple without lymphadenopathy.  No stiffness or restricted ROM.  HEART:  Normal rate and regular rhythm, normal S1/S1, no m/r/g  LUNGS:  CTAB, moving air well. No crackles or wheezes are heard.  ABDOMEN:  Soft, suprapubic discomfort, nondistended with good bowel sounds heard.  : Normal external genitalia.  No cervical motion tenderness.  BACK: No CVAT, no obvious deformity.  EXTREMITIES:  Without cyanosis, clubbing or edema.  NEUROLOGICAL:  Grossly nonfocal. Alert and oriented, moving all 4 extremities. CN not formally tested but appear grossly intact. Observed to ambulate with normal gait.  Skin:  Warm and dry without any rash.  Suprapubic        ED Course  Labs Reviewed   VAGINITIS VAGINOSIS PCR PANEL   CHLAMYDIA/GONOCOCCUS, TREMAYNE     US PELVIS W DOPPLER (EKH=[id number])  Result Date: 6/16/2025  PROCEDURE:  US PELVIS W DOPPLER (RGS=[id number])  COMPARISON:  None.  INDICATIONS:  low abd pain for past 2 days.  states pain is intermittent for past 2 months.  TECHNIQUE:  Transabdominal imaging was performed of the pelvis.   Arterial and venous Doppler of the ovaries was also performed. PATIENT STATED HISTORY: (As transcribed by Technologist)  Pelvic pain.    FINDINGS:            UTERUS:  8.58 cm x 3.42 cm x 4.94 cm   Endometrium Thickness: 0.78 cm   The uterus appears normal in size, shape, and echogenicity. RIGHT OVARY:  4.61 cm x 1.67 cm x 1.98 cm   The right ovary appears normal in size,  shape, and echogenicity. No significant masses are identified. LEFT OVARY:  3.81 cm x 1.70 cm x 1.97 cm   The left ovary appears normal in size, shape, and echogenicity. No significant masses are identified. CUL-DE-SAC:  Normal.  No fluid or mass.  OTHER:  Negative.   DOPPLER WAVE FORMS FLOW:  There is normal arterial and venous Doppler wave forms in both ovaries.  The spectral analysis is within normal limits.  No sign ovarian torsion OTHER:  For this examination, arterial and venous color flow, and arterial and venous spectral wave form analysis with velocities performed.  Real-time images also performed.             CONCLUSION:  Normal transabdominal ultrasound.  No sign of torsion.  No mass.  No free fluid.   LOCATION:  [facility]    Dictated by (CST): De Mcknight MD on 6/16/2025 at 6:37 PM     Finalized by (CST): De Mcknight MD on 6/16/2025 at 6:37 PM                 MDM     20-year-old female who presents to the ER for pelvic pain and vaginal discharge.  Vitals within normal limits.  Differential diagnosis includes but does not exclude vaginitis versus ovarian cyst versus ovarian torsion.  Patient had negative urine pregnancy testing completed at urgent care today which I was able to review.  Swabs are pending and she will follow-up on [facility].  Ultrasound with no acute findings.  Discussed possible further workup with labs and further imaging, however, suspicion for appendicitis versus diverticulitis is very low given ongoing for the past month.  Patient agrees like to hold off and will follow-up with her primary care doctor later this week.  Discussed continued supportive management with patient in the meantime as well as return precautions.  She understands and agrees with discharge.        Medical Decision Making      Disposition and Plan     Clinical Impression:  1. Pelvic pain    2. Vaginal discharge         Disposition:  Discharge  6/16/2025  6:51 pm    Follow-up:  Anjelica Hernandes,   [address] [address]  [phone]    Follow up            Medications Prescribed:  Current Discharge Medication List        START taking these medications    Details   !! ibuprofen 600 MG Oral Tab Take 1 tablet (600 mg total) by mouth every 8 (eight) hours as needed for Pain or Fever.  Qty: 30 tablet, Refills: 0       !! - Potential duplicate medications found. Please discuss with provider.                Supplementary Documentation:

## 2025-06-16 NOTE — TELEPHONE ENCOUNTER
Leonila Pacheco  [facility] Clinical Staff1 hour ago (9:20 AM)     AM  Routed to triage        Charmaine Bowens  P [facility] Front Office9 hours ago (12:37 AM)     [place]  Appointment for: Charmaine Bowens ([id number])  Visit type: MYCHART EXAM ([id number])  6/19/2025 10:40 AM (20 minutes) with Earle Bourne in [facility] [address] [place] [address]     Patient comments:  Cloudiness in pee and abdomen pain

## 2025-06-16 NOTE — DISCHARGE INSTRUCTIONS
Try taking Tylenol 500 mg every 4-6 hours or ibuprofen 600 mg every 6 hours or as needed for pain.  Follow-up on the remainder of your results in MyChart.  Keep appointment with your primary care doctor for Thursday as scheduled.  Return to the ER for any other new or worsening symptoms in the meantime.

## 2025-06-16 NOTE — PROGRESS NOTES
CHIEF COMPLAINT:     Chief Complaint   Patient presents with    Urinary Symptoms     Pain in lower abdomen       HPI:   Charmaine Bowens is a 20 year old female who presents with suprapubic abdominal pain/tenderness and urinary urgency. Pt reports symptoms present x 1 month but worse the past few days.Denies dysuria, increased frequency, flank pain, fever, hematuria, nausea, or vomiting.  Denies abdominal pain,  vaginal discharge, abnormal bleeding, itching, or concerns for std's. Tolerates PO well at home. No n/v/d.  Denies any other aggravating or relieving factors at home. Denies any other treatment attempts prior to arrival.       Current Medications[1]   Past Medical History[2]   Social History:  Short Social Hx on File[3]      REVIEW OF SYSTEMS:   GENERAL: Denies fever, chills, or body aches  SKIN: no rashes, no skin wounds or ulcers.  GI: Denies abdominal pain. No N/V/D.   : See HPI.  NEURO: no headaches.    EXAM:   /75   Pulse 92   Temp 98.3 °F (36.8 °C) (Temporal)   Resp 18   LMP 06/08/2025   SpO2 98%   GENERAL: well developed, well nourished,in no apparent distress  CARDIO: RRR, no murmurs  LUNGS: clear to ausculation bilaterally, no wheezing or rhonchi  GI: soft, non distended. No masses. No organomegaly. BSP  Rigidity: none.  + generalized lower abdominal tenderness.  : + suprapubic tenderness. No bladder distention, or CVAT     Recent Results (from the past 24 hours)   Urine Dip, auto without Micro    Collection Time: 06/16/25  3:50 PM   Result Value Ref Range    Glucose Urine Negative Negative mg/dL    Bilirubin Urine Negative Negative    Ketones, UA Negative Negative - Trace mg/dL    Spec Gravity 1.020 1.005 - 1.030    Blood Urine Negative Negative    PH Urine 7.0 5.0 - 8.0    Protein Urine Negative Negative - Trace mg/dL    Urobilinogen Urine 0.2 0.2 - 1.0 mg/dL    Nitrite Urine Negative Negative    Leukocyte Esterase Urine Negative Negative    APPEARANCE Clear Clear    Color Urine Yellow  Yellow    Multistix Lot# [id number] Numeric    Multistix Expiration Date 2/28/2026 Date   Urine Preg Test    Collection Time: 06/16/25  3:52 PM   Result Value Ref Range    Pregnancy Test, Urine Negative     Control Line Present with a clear background (yes/no) Yes Yes/No    Kit Lot # [id number] Numeric    Kit Expiration Date 4/11/2026 Date         ASSESSMENT AND PLAN:       ICD-10-CM    1. Urinary urgency  R39.15 Urine Preg Test     Urine Dip, auto without Micro      2. Pelvic pain  R10.2         Urine dip wnl  Urine pregnancy negative.    Discussed HPI and physical exam with pt. We discussed the limited diagnostic capacity of this clinic and there are dangerous conditions associated with her worsening pelvic pain that I can not fully rule out. I advised she be seen in the ED. Pt verbalized understanding and notes she will go to the Main ED. She declined medical transport and has family/friend to drive her there.           [1]   Current Outpatient Medications   Medication Sig Dispense Refill    medroxyPROGESTERone Acetate 10 MG Oral Tab Take 1 tablet (10 mg total) by mouth daily.      cyclobenzaprine 5 MG Oral Tab Take 1 tablet (5 mg total) by mouth nightly. 30 tablet 0    Misc Natural Products (FIBER 7 OR) Take by mouth.      polyethylene glycol, PEG 3350, 17 g Oral Powd Pack Take 17 g by mouth daily. (Patient not taking: Reported on 4/7/2025)      albuterol 108 (90 Base) MCG/ACT Inhalation Aero Soln Inhale 2 puffs into the lungs every 6 (six) hours as needed. 1 each 0    methylPREDNISolone (MEDROL) 4 MG Oral Tablet Therapy Pack As directed. (Patient not taking: Reported on 4/7/2025) 1 each 0    Cholecalciferol (VITAMIN D) 50 MCG (2000 UT) Oral Tab Take by mouth.      Multiple Vitamin (MULTI-DAY VITAMINS) Oral Tab Take by mouth.      buPROPion  MG Oral Tablet 24 Hr Take 1 tablet (150 mg total) by mouth daily. (Patient not taking: Reported on 5/2/2024) 30 tablet 1    Cholecalciferol (VITAMIN D3) 1.25 MG (97021 UT)  Oral Tab Take 50,000 tablets by mouth every 7 days. (Patient not taking: Reported on 4/7/2025) 12 tablet 0    ibuprofen 600 MG Oral Tab Take 1 tablet (600 mg total) by mouth 2 (two) times daily as needed for Pain. 60 tablet 0    spironolactone 50 MG Oral Tab Take 1 tablet (50 mg total) by mouth daily. (Patient not taking: Reported on 12/13/2024)      Drospirenone-Ethinyl Estradiol 3-0.02 MG Oral Tab Take 1 tablet by mouth daily. (Patient not taking: Reported on 2/22/2024)     [2] No past medical history on file.  [3]   Social History  Socioeconomic History    Marital status: Single   Tobacco Use    Smoking status: Never     Passive exposure: Never    Smokeless tobacco: Never   Vaping Use    Vaping status: Never Used   Substance and Sexual Activity    Alcohol use: Never    Drug use: Never    Sexual activity: Never     Social Drivers of Health     Food Insecurity: No Food Insecurity (2/10/2025)    NCSS - Food Insecurity     Worried About Running Out of Food in the Last Year: No     Ran Out of Food in the Last Year: No   Transportation Needs: No Transportation Needs (2/10/2025)    NCSS - Transportation     Lack of Transportation: No   Housing Stability: Not At Risk (2/10/2025)    NCSS - Housing/Utilities     Has Housing: Yes     Worried About Losing Housing: No     Unable to Get Utilities: No

## 2025-06-17 LAB
BV BACTERIA DNA VAG QL NAA+PROBE: NEGATIVE
C GLABRATA DNA VAG QL NAA+PROBE: NEGATIVE
C KRUSEI DNA VAG QL NAA+PROBE: NEGATIVE
C TRACH DNA SPEC QL NAA+PROBE: NEGATIVE
CANDIDA DNA VAG QL NAA+PROBE: NEGATIVE
N GONORRHOEA DNA SPEC QL NAA+PROBE: NEGATIVE
T VAGINALIS DNA VAG QL NAA+PROBE: NEGATIVE

## 2025-06-18 RX ORDER — IBUPROFEN 600 MG/1
600 TABLET, FILM COATED ORAL 2 TIMES DAILY PRN
Qty: 60 TABLET | Refills: 0 | OUTPATIENT
Start: 2025-06-18

## 2025-08-26 ENCOUNTER — OFFICE VISIT (OUTPATIENT)
Dept: FAMILY MEDICINE CLINIC | Facility: CLINIC | Age: 20
End: 2025-08-26

## 2025-08-26 VITALS
DIASTOLIC BLOOD PRESSURE: 62 MMHG | TEMPERATURE: 98 F | WEIGHT: 130 LBS | OXYGEN SATURATION: 98 % | RESPIRATION RATE: 16 BRPM | SYSTOLIC BLOOD PRESSURE: 110 MMHG | BODY MASS INDEX: 22.2 KG/M2 | HEART RATE: 94 BPM | HEIGHT: 64 IN

## 2025-08-26 DIAGNOSIS — J06.9 URI WITH COUGH AND CONGESTION: Primary | ICD-10-CM

## 2025-08-26 PROCEDURE — 99213 OFFICE O/P EST LOW 20 MIN: CPT
